# Patient Record
Sex: FEMALE | Race: WHITE | NOT HISPANIC OR LATINO | Employment: UNEMPLOYED | URBAN - METROPOLITAN AREA
[De-identification: names, ages, dates, MRNs, and addresses within clinical notes are randomized per-mention and may not be internally consistent; named-entity substitution may affect disease eponyms.]

---

## 2021-08-09 ENCOUNTER — HOSPITAL ENCOUNTER (EMERGENCY)
Facility: CLINIC | Age: 61
Discharge: HOME | End: 2021-08-10
Attending: EMERGENCY MEDICINE
Payer: COMMERCIAL

## 2021-08-09 DIAGNOSIS — R04.2 HEMOPTYSIS: ICD-10-CM

## 2021-08-09 LAB
ALBUMIN SERPL-MCNC: 3.7 G/DL (ref 3.4–5)
ALP SERPL-CCNC: 83 U/L (ref 40–150)
ALT SERPL W P-5'-P-CCNC: 30 U/L (ref 0–50)
ANION GAP SERPL CALCULATED.3IONS-SCNC: 5 MMOL/L (ref 3–14)
AST SERPL W P-5'-P-CCNC: 29 U/L (ref 0–45)
BILIRUB SERPL-MCNC: 0.3 MG/DL (ref 0.2–1.3)
BUN SERPL-MCNC: 15 MG/DL (ref 7–30)
CALCIUM SERPL-MCNC: 8.8 MG/DL (ref 8.5–10.1)
CHLORIDE BLD-SCNC: 106 MMOL/L (ref 94–109)
CO2 SERPL-SCNC: 27 MMOL/L (ref 20–32)
CREAT SERPL-MCNC: 0.6 MG/DL (ref 0.52–1.04)
GFR SERPL CREATININE-BSD FRML MDRD: >90 ML/MIN/1.73M2
GLUCOSE BLD-MCNC: 79 MG/DL (ref 70–99)
HOLD SPECIMEN: NORMAL
INR PPP: 0.94 (ref 0.85–1.15)
POTASSIUM BLD-SCNC: 3.8 MMOL/L (ref 3.4–5.3)
PROT SERPL-MCNC: 8 G/DL (ref 6.8–8.8)
SODIUM SERPL-SCNC: 138 MMOL/L (ref 133–144)

## 2021-08-09 PROCEDURE — 99284 EMERGENCY DEPT VISIT MOD MDM: CPT | Mod: 25 | Performed by: EMERGENCY MEDICINE

## 2021-08-09 PROCEDURE — 36415 COLL VENOUS BLD VENIPUNCTURE: CPT | Performed by: EMERGENCY MEDICINE

## 2021-08-09 PROCEDURE — 93005 ELECTROCARDIOGRAM TRACING: CPT | Performed by: EMERGENCY MEDICINE

## 2021-08-09 PROCEDURE — 84484 ASSAY OF TROPONIN QUANT: CPT | Performed by: EMERGENCY MEDICINE

## 2021-08-09 PROCEDURE — 99285 EMERGENCY DEPT VISIT HI MDM: CPT | Mod: 25 | Performed by: EMERGENCY MEDICINE

## 2021-08-09 PROCEDURE — 82040 ASSAY OF SERUM ALBUMIN: CPT | Performed by: EMERGENCY MEDICINE

## 2021-08-09 PROCEDURE — 85610 PROTHROMBIN TIME: CPT | Performed by: EMERGENCY MEDICINE

## 2021-08-09 PROCEDURE — 93010 ELECTROCARDIOGRAM REPORT: CPT | Performed by: EMERGENCY MEDICINE

## 2021-08-09 PROCEDURE — 84155 ASSAY OF PROTEIN SERUM: CPT | Performed by: EMERGENCY MEDICINE

## 2021-08-09 PROCEDURE — 85025 COMPLETE CBC W/AUTO DIFF WBC: CPT | Performed by: EMERGENCY MEDICINE

## 2021-08-09 ASSESSMENT — MIFFLIN-ST. JEOR: SCORE: 1058.03

## 2021-08-09 NOTE — ED TRIAGE NOTES
Pt presents accompanied by her daughter with c/o hemoptysis x 6 wks.  H/o bronchiectasis.  Plan is for a bronchoscopy and lung wash.  +Rt flank pain.  No fever. She recently relocated from [place] to see specialists here.

## 2021-08-09 NOTE — ED PROVIDER NOTES
[place] EMERGENCY DEPARTMENT ([facility])  August 9, 2021  History     Chief Complaint   Patient presents with     Hemoptysis     HPI  Alis Zambrano is a 61 year old female with prior history of bronchiectasis who presents for further evaluation of cough with hemoptysis. Patient reports that she was diagnosed with bronchiectasis 5 to 7 years ago at [facility].  She states that things have been relatively stable for quite some time.  She does report some degree of chronic shortness of breath, as well as some degree of intermittent chronic cough and intermittent mucus production.  She states that about 6 weeks ago she had an episode of a small amount of hemoptysis with a small amount of clot coming up.  That spontaneously resolved, though came back again sometime later.  She subsequently notes that this past Saturday, 2 days ago, she had another bout but this time with quite a bit more blood.  She states she coughed up half a cup or more of bright red blood.  She reports that her shortness of breath also has seemed to progressively worsen.  She states that since Saturday she has had a small amount of blood streaking in her sputum intermittently.  No chest pain but she has had some back pain near her left bra line intermittently, intermittantly states it is pretty bad.  No fevers, abdominal pain, nausea, vomiting, diarrhea.  She states she has passed some mucus in her stool as well over the last few weeks.  She also reports she has had some lack of appetite and overall is feeling fatigued and somewhat weak.  She states she does not have any other chronic medical issues, does not take any medications on a daily basis.  No history of bleeding disorder.  No recent bloody nose, though she does states she chronically is an easy bruising.  This part of the medical record was transcribed by Betito Menon [employer].      She was just seen at [facility] emergency department 2 days ago for  "hemoptysis.  She had been coughing at morning and noticed some blood in her cough, coughing up several clots.,  Prior to that had been seen at the [address] ED.  She has had CT scan that showed:  CT scan as an outpatient which demonstrated: \"Progressive nonspecific peripheral multifocal consolidation of the right middle lobe and right lower lobe with areas of bronchiectasis. Infectious versus inflammatory process is possible. No imaging findings to suggest malignancy. Recommend management by Pulmonology and followup imaging.\"     She did just moved to the [facility] to be closer to medical care and to her daughter.  The her daughter called the pulmonary clinic today and was told that it would be quite some time before she could get a pulmonary appointment and they recommended that they she should come in because they did not think she should wait that long to be seen.  This part of the medical record was transcribed by Betito Menon [employer].     PAST MEDICAL HISTORY: History reviewed. No pertinent past medical history.    PAST SURGICAL HISTORY: No past surgical history on file.    Past medical history, past surgical history, medications, and allergies were reviewed with the patient. Additional pertinent items: None    FAMILY HISTORY: No family history on file.    SOCIAL HISTORY:   Social History     Tobacco Use     Smoking status: Not on file   Substance Use Topics     Alcohol use: Not on file     Social history was reviewed with the patient. Additional pertinent items: None      There are no discharge medications for this patient.         Allergies   Allergen Reactions     Sulfa Drugs Other (See Comments) and Hives     All vitamins makes her esophagus spasm, heartburn     Augmentin Diarrhea     Cetirizine      Diphenhydramine      Famotidine      Ipratropium      Pantoprazole      Shrimp Flavor      Camphor Other (See Comments)     Hyper     Ciprofloxacin Other (See Comments)     Other " "reaction(s): Unknown/Not Verified  Knees didn't work for a couple of months  Knees don't work  Leg weakness       Codeine Dizziness, Headache and Nausea and Vomiting     Conjugated Estrogens Other (See Comments)     Other reaction(s): Unknown/Not Verified  Knees didn't work  Knees don't work       Nitrofurantoin Other (See Comments)     Achey       Penicillins Diarrhea     Prednisone Anxiety and Other (See Comments)     Other reaction(s): Tremors, Unknown/Not Verified  Anxiety          Review of Systems   Constitutional: Positive for activity change, appetite change and fatigue. Negative for chills and fever.   Respiratory: Positive for cough and shortness of breath.    Cardiovascular: Negative for chest pain.   Gastrointestinal: Negative for abdominal pain, diarrhea, nausea and vomiting.   Musculoskeletal: Positive for back pain (L braline).   Hematological: Bruises/bleeds easily.   All other systems reviewed and are negative.    A complete review of systems was performed with pertinent positives and negatives noted in the HPI, and all other systems negative.    Physical Exam   BP: 120/66  Pulse: 90  Temp: 98.7  F (37.1  C)  Resp: 16  Height: 167.6 cm (5' 6\")  Weight: 47.6 kg (105 lb)  SpO2: 96 %      Physical Exam  Constitutional:       General: She is not in acute distress.     Appearance: She is not diaphoretic.   HENT:      Head: Atraumatic.   Eyes:      General: No scleral icterus.  Cardiovascular:      Heart sounds: Normal heart sounds.   Pulmonary:      Effort: No respiratory distress.      Breath sounds: Normal breath sounds.   Abdominal:      Palpations: Abdomen is soft.      Tenderness: There is no abdominal tenderness.   Musculoskeletal:         General: No tenderness.   Skin:     General: Skin is warm.      Findings: No rash.         ED Course   12:32 AM  The patient was seen and examined by Rani Peoples MD in Room ED12.       Procedures            EKG Interpretation:      Interpreted by Rani Ochoa " MD Richelle  Time reviewed: 0055  Symptoms at time of EKG: none   Rhythm: normal sinus   Rate: normal  Axis: normal  Ectopy: none  Conduction: normal  ST Segments/ T Waves: No ST-T wave changes  Q Waves: none  Comparison to prior: No old EKG available    Clinical Impression: normal EKG               Results for orders placed or performed during the hospital encounter of 08/09/21 (from the past 24 hour(s))   Mart Draw *Canceled*    Narrative    The following orders were created for panel order Mart Draw.  Procedure                               Abnormality         Status                     ---------                               -----------         ------                       Please view results for these tests on the individual orders.   Mart Draw    Narrative    The following orders were created for panel order Mart Draw.  Procedure                               Abnormality         Status                     ---------                               -----------         ------                     Extra Red Top Tube[[id number]]                               Final result               Extra Green Top (Lithium...[[id number]]                      Final result               Extra Purple Top Tube[[id number]]                            Final result               Extra Blood Bank Purple ...[[id number]]                      In process                   Please view results for these tests on the individual orders.   Extra Red Top Tube   Result Value Ref Range    Hold Specimen JIC    Extra Green Top (Lithium Heparin) Tube   Result Value Ref Range    Hold Specimen JIC    Extra Purple Top Tube   Result Value Ref Range    Hold Specimen JIC    Extra Tube    Narrative    The following orders were created for panel order Extra Tube.  Procedure                               Abnormality         Status                     ---------                               -----------         ------                     Extra Blue Top  Tube[[id number]]                              Final result                 Please view results for these tests on the individual orders.   Extra Blue Top Tube   Result Value Ref Range    Hold Specimen JIC    CBC with platelets differential    Narrative    The following orders were created for panel order CBC with platelets differential.  Procedure                               Abnormality         Status                     ---------                               -----------         ------                     CBC with platelets and d...[[id number]]                      Final result                 Please view results for these tests on the individual orders.   INR   Result Value Ref Range    INR 0.94 0.85 - 1.15   ABO/Rh type and screen *Canceled*    Narrative    The following orders were created for panel order ABO/Rh type and screen.  Procedure                               Abnormality         Status                     ---------                               -----------         ------                     Adult Type and Screen[[id number]]                                                         Please view results for these tests on the individual orders.   Comprehensive metabolic panel   Result Value Ref Range    Sodium 138 133 - 144 mmol/L    Potassium 3.8 3.4 - 5.3 mmol/L    Chloride 106 94 - 109 mmol/L    Carbon Dioxide (CO2) 27 20 - 32 mmol/L    Anion Gap 5 3 - 14 mmol/L    Urea Nitrogen 15 7 - 30 mg/dL    Creatinine 0.60 0.52 - 1.04 mg/dL    Calcium 8.8 8.5 - 10.1 mg/dL    Glucose 79 70 - 99 mg/dL    Alkaline Phosphatase 83 40 - 150 U/L    AST 29 0 - 45 U/L    ALT 30 0 - 50 U/L    Protein Total 8.0 6.8 - 8.8 g/dL    Albumin 3.7 3.4 - 5.0 g/dL    Bilirubin Total 0.3 0.2 - 1.3 mg/dL    GFR Estimate >90 >60 mL/min/1.73m2   CBC with platelets and differential   Result Value Ref Range    WBC Count 6.7 4.0 - 11.0 10e3/uL    RBC Count 4.62 3.80 - 5.20 10e6/uL    Hemoglobin 12.9 11.7 - 15.7 g/dL    Hematocrit 41.0 35.0 -  47.0 %    MCV 89 78 - 100 fL    MCH 27.9 26.5 - 33.0 pg    MCHC 31.5 31.5 - 36.5 g/dL    RDW 14.9 10.0 - 15.0 %    Platelet Count 378 150 - 450 10e3/uL    % Neutrophils 64 %    % Lymphocytes 21 %    % Monocytes 10 %    % Eosinophils 4 %    % Basophils 1 %    % Immature Granulocytes 0 %    NRBCs per 100 WBC 0 <1 /100    Absolute Neutrophils 4.3 1.6 - 8.3 10e3/uL    Absolute Lymphocytes 1.4 0.8 - 5.3 10e3/uL    Absolute Monocytes 0.7 0.0 - 1.3 10e3/uL    Absolute Eosinophils 0.3 0.0 - 0.7 10e3/uL    Absolute Basophils 0.1 0.0 - 0.2 10e3/uL    Absolute Immature Granulocytes 0.0 <=0.0 10e3/uL    Absolute NRBCs 0.0 10e3/uL   Troponin I   Result Value Ref Range    Troponin I <0.015 0.000 - 0.045 ug/L   ABO/Rh type and screen *Canceled*    Narrative    The following orders were created for panel order ABO/Rh type and screen.  Procedure                               Abnormality         Status                     ---------                               -----------         ------                       Please view results for these tests on the individual orders.   Chest XR,  PA & LAT    Narrative    EXAM: XR CHEST 2 VW  LOCATION: [facility]  DATE/TIME: 8/10/2021 12:35 AM    INDICATION: Hemoptysis, shortness of breath.    COMPARISON: None.    FINDINGS: Large lung volumes. Linear opacity in the right middle lobe, best seen on the lateral view, may be due to atelectasis but infiltrate is not excluded. Clinical correlation. Left lung is clear. Normal heart size and pulmonary vascularity. No   significant bony abnormalities.      Impression    IMPRESSION:  1. Hyperinflation of the lungs suggesting underlying obstructive lung disease.  2. Indeterminate linear opacity in the right middle lobe may be due to infiltrate versus atelectasis. Recommend short-term follow-up chest x-ray to monitor for clearing.   EKG 12 lead   Result Value Ref Range    Systolic Blood Pressure  mmHg    Diastolic  Blood Pressure  mmHg    Ventricular Rate 67 BPM    Atrial Rate 67 BPM    MA Interval 150 ms    QRS Duration 78 ms     ms    QTc 454 ms    P Axis 82 degrees    R AXIS 86 degrees    T Axis 74 degrees    Interpretation ECG Sinus rhythm  Normal ECG        Medications   0.9% sodium chloride BOLUS (0 mLs Intravenous Stopped 8/10/21 0315)             Assessments & Plan (with Medical Decision Making)   CBC was checked, hemoglobin is 12.9, platelets normal.  INR is normal as well.  EKG is unremarkable, troponin was negative.  Chest x-ray revealed hyperinflation of the lungs suggesting underlying obstructive lung disease as well as indeterminate linear opacity of the right middle lobe may be due to infiltrate versus atelectasis.  Patient is doing relatively well at this point in time.  No active hemoptysis.  I did speak with the pulmonologist on-call .  It is clear that the patient would benefit from a bronchoscopy.  After discussion, we agreed that this likely can safely be done as an outpatient, particularly in light of our critical bed shortage here in the hospital.  Dr. Lopes will attempt to arrange an urgent outpatient bronchoscopy in the morning.  Patient is told she may have something to eat ASAP, then remain n.p.o. until she hears back from the pulmonary clinic in the morning.  She is encouraged to return immediately if she develops any significant hemoptysis, any other concerns.  She verbalizes understanding and is agreeable to the plan.  She is also instructed to call the clinic if she does not hear back from them by noon.    Dictation Disclaimer: Some of this Note has been completed with voice-recognition dictation software. Although errors are generally corrected real-time, there is the potential for a rare error to be present in the completed chart.      I have reviewed the nursing notes.    I have reviewed the findings, diagnosis, plan and need for follow up with the patient.    There  are no discharge medications for this patient.      Final diagnoses:   Hemoptysis     ILionel, am serving as a trained medical scribe to document services personally performed by Rani Pepoles MD, based on the provider's statements to me.   Rani JAMIL MD, was physically present and have reviewed and verified the accuracy of this note documented by Lionel Bolaños.      8/9/2021   [facility] EMERGENCY DEPARTMENT     Rani Peoples MD  08/10/21 0621

## 2021-08-10 ENCOUNTER — APPOINTMENT (OUTPATIENT)
Dept: GENERAL RADIOLOGY | Facility: CLINIC | Age: 61
End: 2021-08-10
Attending: EMERGENCY MEDICINE
Payer: COMMERCIAL

## 2021-08-10 ENCOUNTER — HOSPITAL ENCOUNTER (OUTPATIENT)
Facility: CLINIC | Age: 61
Discharge: HOME | End: 2021-08-10
Attending: INTERNAL MEDICINE | Admitting: INTERNAL MEDICINE
Payer: COMMERCIAL

## 2021-08-10 ENCOUNTER — ANCILLARY PROCEDURE (OUTPATIENT)
Dept: CT IMAGING | Facility: CLINIC | Age: 61
End: 2021-08-10
Attending: INTERNAL MEDICINE
Payer: COMMERCIAL

## 2021-08-10 VITALS
TEMPERATURE: 98.2 F | WEIGHT: 107.14 LBS | RESPIRATION RATE: 20 BRPM | HEART RATE: 98 BPM | DIASTOLIC BLOOD PRESSURE: 64 MMHG | SYSTOLIC BLOOD PRESSURE: 125 MMHG | OXYGEN SATURATION: 98 % | HEIGHT: 66 IN | BODY MASS INDEX: 17.22 KG/M2

## 2021-08-10 VITALS
SYSTOLIC BLOOD PRESSURE: 99 MMHG | OXYGEN SATURATION: 97 % | WEIGHT: 105 LBS | TEMPERATURE: 98.7 F | DIASTOLIC BLOOD PRESSURE: 62 MMHG | RESPIRATION RATE: 16 BRPM | BODY MASS INDEX: 16.88 KG/M2 | HEART RATE: 75 BPM | HEIGHT: 66 IN

## 2021-08-10 DIAGNOSIS — R04.2 HEMOPTYSIS: ICD-10-CM

## 2021-08-10 DIAGNOSIS — R04.2 HEMOPTYSIS: Primary | ICD-10-CM

## 2021-08-10 LAB
APPEARANCE FLD: ABNORMAL
ATRIAL RATE - MUSE: 67 BPM
BASOPHILS # BLD AUTO: 0.1 10E3/UL (ref 0–0.2)
BASOPHILS NFR BLD AUTO: 1 %
BRONCHOSCOPY: NORMAL
COLOR FLD: ABNORMAL
DIASTOLIC BLOOD PRESSURE - MUSE: NORMAL MMHG
EOSINOPHIL # BLD AUTO: 0.3 10E3/UL (ref 0–0.7)
EOSINOPHIL NFR BLD AUTO: 4 %
ERYTHROCYTE [DISTWIDTH] IN BLOOD BY AUTOMATED COUNT: 14.9 % (ref 10–15)
GLUCOSE BLDC GLUCOMTR-MCNC: 73 MG/DL (ref 70–99)
GRAM STAIN RESULT: NORMAL
GRAM STAIN RESULT: NORMAL
HCT VFR BLD AUTO: 41 % (ref 35–47)
HGB BLD-MCNC: 12.9 G/DL (ref 11.7–15.7)
IMM GRANULOCYTES # BLD: 0 10E3/UL
IMM GRANULOCYTES NFR BLD: 0 %
INTERPRETATION ECG - MUSE: NORMAL
LYMPHOCYTES # BLD AUTO: 1.4 10E3/UL (ref 0.8–5.3)
LYMPHOCYTES NFR BLD AUTO: 21 %
LYMPHOCYTES NFR FLD MANUAL: 5 %
MCH RBC QN AUTO: 27.9 PG (ref 26.5–33)
MCHC RBC AUTO-ENTMCNC: 31.5 G/DL (ref 31.5–36.5)
MCV RBC AUTO: 89 FL (ref 78–100)
MONOCYTES # BLD AUTO: 0.7 10E3/UL (ref 0–1.3)
MONOCYTES NFR BLD AUTO: 10 %
MONOS+MACROS NFR FLD MANUAL: 11 %
NEUTROPHILS # BLD AUTO: 4.3 10E3/UL (ref 1.6–8.3)
NEUTROPHILS NFR BLD AUTO: 64 %
NEUTS BAND NFR FLD MANUAL: 84 %
NRBC # BLD AUTO: 0 10E3/UL
NRBC BLD AUTO-RTO: 0 /100
P AXIS - MUSE: 82 DEGREES
PLATELET # BLD AUTO: 378 10E3/UL (ref 150–450)
PR INTERVAL - MUSE: 150 MS
QRS DURATION - MUSE: 78 MS
QT - MUSE: 430 MS
QTC - MUSE: 454 MS
R AXIS - MUSE: 86 DEGREES
RBC # BLD AUTO: 4.62 10E6/UL (ref 3.8–5.2)
SARS-COV-2 RNA RESP QL NAA+PROBE: NEGATIVE
SYSTOLIC BLOOD PRESSURE - MUSE: NORMAL MMHG
T AXIS - MUSE: 74 DEGREES
TROPONIN I SERPL-MCNC: <0.015 UG/L (ref 0–0.04)
VENTRICULAR RATE- MUSE: 67 BPM
WBC # BLD AUTO: 6.7 10E3/UL (ref 4–11)
WBC # FLD AUTO: 2675 /UL

## 2021-08-10 PROCEDURE — U0005 INFEC AGEN DETEC AMPLI PROBE: HCPCS | Performed by: INTERNAL MEDICINE

## 2021-08-10 PROCEDURE — 31624 DX BRONCHOSCOPE/LAVAGE: CPT | Performed by: INTERNAL MEDICINE

## 2021-08-10 PROCEDURE — 31624 DX BRONCHOSCOPE/LAVAGE: CPT | Mod: GC | Performed by: INTERNAL MEDICINE

## 2021-08-10 PROCEDURE — 87102 FUNGUS ISOLATION CULTURE: CPT | Mod: XU | Performed by: INTERNAL MEDICINE

## 2021-08-10 PROCEDURE — 96360 HYDRATION IV INFUSION INIT: CPT | Performed by: EMERGENCY MEDICINE

## 2021-08-10 PROCEDURE — 71250 CT THORAX DX C-: CPT | Mod: 26 | Performed by: RADIOLOGY

## 2021-08-10 PROCEDURE — 99152 MOD SED SAME PHYS/QHP 5/>YRS: CPT | Mod: GC | Performed by: INTERNAL MEDICINE

## 2021-08-10 PROCEDURE — 87070 CULTURE OTHR SPECIMN AEROBIC: CPT | Performed by: INTERNAL MEDICINE

## 2021-08-10 PROCEDURE — 87206 SMEAR FLUORESCENT/ACID STAI: CPT | Performed by: INTERNAL MEDICINE

## 2021-08-10 PROCEDURE — 89051 BODY FLUID CELL COUNT: CPT | Performed by: INTERNAL MEDICINE

## 2021-08-10 PROCEDURE — 250N000011 HC RX IP 250 OP 636: Performed by: INTERNAL MEDICINE

## 2021-08-10 PROCEDURE — 87102 FUNGUS ISOLATION CULTURE: CPT | Performed by: INTERNAL MEDICINE

## 2021-08-10 PROCEDURE — 87116 MYCOBACTERIA CULTURE: CPT | Performed by: INTERNAL MEDICINE

## 2021-08-10 PROCEDURE — G0500 MOD SEDAT ENDO SERVICE >5YRS: HCPCS | Performed by: INTERNAL MEDICINE

## 2021-08-10 PROCEDURE — 88108 CYTOPATH CONCENTRATE TECH: CPT | Mod: TC | Performed by: INTERNAL MEDICINE

## 2021-08-10 PROCEDURE — 87633 RESP VIRUS 12-25 TARGETS: CPT | Mod: XU | Performed by: INTERNAL MEDICINE

## 2021-08-10 PROCEDURE — 250N000009 HC RX 250: Performed by: INTERNAL MEDICINE

## 2021-08-10 PROCEDURE — 71046 X-RAY EXAM CHEST 2 VIEWS: CPT | Mod: 26 | Performed by: RADIOLOGY

## 2021-08-10 PROCEDURE — 96361 HYDRATE IV INFUSION ADD-ON: CPT | Mod: 59 | Performed by: EMERGENCY MEDICINE

## 2021-08-10 PROCEDURE — 258N000003 HC RX IP 258 OP 636: Performed by: EMERGENCY MEDICINE

## 2021-08-10 PROCEDURE — 71046 X-RAY EXAM CHEST 2 VIEWS: CPT

## 2021-08-10 PROCEDURE — 87205 SMEAR GRAM STAIN: CPT | Performed by: INTERNAL MEDICINE

## 2021-08-10 RX ORDER — LIDOCAINE 40 MG/G
CREAM TOPICAL
Status: DISCONTINUED | OUTPATIENT
Start: 2021-08-10 | End: 2021-08-10 | Stop reason: HOSPADM

## 2021-08-10 RX ORDER — ONDANSETRON 2 MG/ML
4 INJECTION INTRAMUSCULAR; INTRAVENOUS EVERY 6 HOURS PRN
Status: CANCELLED | OUTPATIENT
Start: 2021-08-10

## 2021-08-10 RX ORDER — NALOXONE HYDROCHLORIDE 0.4 MG/ML
0.2 INJECTION, SOLUTION INTRAMUSCULAR; INTRAVENOUS; SUBCUTANEOUS
Status: CANCELLED | OUTPATIENT
Start: 2021-08-10

## 2021-08-10 RX ORDER — FENTANYL CITRATE 50 UG/ML
INJECTION, SOLUTION INTRAMUSCULAR; INTRAVENOUS PRN
Status: COMPLETED | OUTPATIENT
Start: 2021-08-10 | End: 2021-08-10

## 2021-08-10 RX ORDER — MAGNESIUM HYDROXIDE 1200 MG/15ML
LIQUID ORAL PRN
Status: COMPLETED | OUTPATIENT
Start: 2021-08-10 | End: 2021-08-10

## 2021-08-10 RX ORDER — LIDOCAINE HYDROCHLORIDE 20 MG/ML
INJECTION, SOLUTION INFILTRATION; PERINEURAL PRN
Status: COMPLETED | OUTPATIENT
Start: 2021-08-10 | End: 2021-08-10

## 2021-08-10 RX ORDER — FLUMAZENIL 0.1 MG/ML
0.2 INJECTION, SOLUTION INTRAVENOUS
Status: CANCELLED | OUTPATIENT
Start: 2021-08-10 | End: 2021-08-11

## 2021-08-10 RX ORDER — ONDANSETRON 4 MG/1
4 TABLET, ORALLY DISINTEGRATING ORAL EVERY 6 HOURS PRN
Status: CANCELLED | OUTPATIENT
Start: 2021-08-10

## 2021-08-10 RX ORDER — NALOXONE HYDROCHLORIDE 0.4 MG/ML
0.4 INJECTION, SOLUTION INTRAMUSCULAR; INTRAVENOUS; SUBCUTANEOUS
Status: CANCELLED | OUTPATIENT
Start: 2021-08-10

## 2021-08-10 RX ORDER — LIDOCAINE HYDROCHLORIDE 40 MG/ML
INJECTION, SOLUTION RETROBULBAR PRN
Status: COMPLETED | OUTPATIENT
Start: 2021-08-10 | End: 2021-08-10

## 2021-08-10 RX ORDER — LIDOCAINE HYDROCHLORIDE 10 MG/ML
INJECTION, SOLUTION INFILTRATION; PERINEURAL PRN
Status: COMPLETED | OUTPATIENT
Start: 2021-08-10 | End: 2021-08-10

## 2021-08-10 RX ADMIN — MIDAZOLAM 0.5 MG: 1 INJECTION INTRAMUSCULAR; INTRAVENOUS at 15:04

## 2021-08-10 RX ADMIN — SODIUM CHLORIDE 120 ML: 900 IRRIGANT IRRIGATION at 15:24

## 2021-08-10 RX ADMIN — LIDOCAINE HYDROCHLORIDE 9 ML: 10 INJECTION, SOLUTION INFILTRATION; PERINEURAL at 15:22

## 2021-08-10 RX ADMIN — FENTANYL CITRATE 25 MCG: 50 INJECTION, SOLUTION INTRAMUSCULAR; INTRAVENOUS at 15:00

## 2021-08-10 RX ADMIN — LIDOCAINE HYDROCHLORIDE 9 ML: 40 INJECTION, SOLUTION RETROBULBAR; TOPICAL at 15:23

## 2021-08-10 RX ADMIN — SODIUM CHLORIDE 1000 ML: 9 INJECTION, SOLUTION INTRAVENOUS at 01:36

## 2021-08-10 RX ADMIN — MIDAZOLAM 1 MG: 1 INJECTION INTRAMUSCULAR; INTRAVENOUS at 14:52

## 2021-08-10 RX ADMIN — MIDAZOLAM 0.5 MG: 1 INJECTION INTRAMUSCULAR; INTRAVENOUS at 15:00

## 2021-08-10 RX ADMIN — FENTANYL CITRATE 50 MCG: 50 INJECTION, SOLUTION INTRAMUSCULAR; INTRAVENOUS at 14:52

## 2021-08-10 RX ADMIN — LIDOCAINE HYDROCHLORIDE 5 ML: 20 INJECTION, SOLUTION INFILTRATION; PERINEURAL at 15:26

## 2021-08-10 RX ADMIN — MIDAZOLAM 1 MG: 1 INJECTION INTRAMUSCULAR; INTRAVENOUS at 14:56

## 2021-08-10 ASSESSMENT — ENCOUNTER SYMPTOMS
BRUISES/BLEEDS EASILY: 1
CHILLS: 0
DIARRHEA: 0
SHORTNESS OF BREATH: 1
VOMITING: 0
FATIGUE: 1
ACTIVITY CHANGE: 1
FEVER: 0
ABDOMINAL PAIN: 0
COUGH: 1
NAUSEA: 0
APPETITE CHANGE: 1
BACK PAIN: 1

## 2021-08-10 ASSESSMENT — MIFFLIN-ST. JEOR: SCORE: 1067.75

## 2021-08-10 NOTE — DISCHARGE INSTRUCTIONS
Discharge Instructions after Bronchoscopy    Activity  You had medicine to relax and for pain. You may feel dizzy or sleepy.  For 24 hours:    Do not drive or use heavy equipment.    Do not make important decisions.    Do not drink any alcohol.    Diet  When you can swallow easily, you may go back to your regular diet, medicines  and light exercise.    Follow-up  We took small tissue or fluid samples to study. We will call you with the results in about 10 business days.    Call right away if you have:    Unusual chest pain    Temperature above 100.6  F (37.5  C)    Coughing that does not stop.    If you have severe pain, bleeding, or shortness of breath, go to an emergency room.    If you have questions, call:  Monday to Friday, 8 a.m. to 4:30 p.m.  Endoscopy: [phone] (We may have to call you back)    After hours:  Hospital: [phone] (Ask for the pulmonary fellow on call)

## 2021-08-10 NOTE — DISCHARGE INSTRUCTIONS
Eat something asap, then don't eat again until you hear from the pulmonologist. Return to the ER if your bleeding increases or if you have any other worsening or concerns.      Call the Pulmonology Clinic (phone: [phone]) if you have not gotten a phone call with an appointment by noon today. You can let them know that I spoke with Dr Lopes who was going to work on arranging and urgent bronchoscopy.

## 2021-08-11 LAB
CMV DNA SPEC NAA+PROBE-ACNC: NOT DETECTED IU/ML
FLUAV H1 2009 PAND RNA SPEC QL NAA+PROBE: NEGATIVE
FLUAV H1 RNA SPEC QL NAA+PROBE: NEGATIVE
FLUAV H3 RNA SPEC QL NAA+PROBE: NEGATIVE
FLUAV RNA SPEC QL NAA+PROBE: NEGATIVE
FLUBV RNA SPEC QL NAA+PROBE: NEGATIVE
HADV DNA SPEC QL NAA+PROBE: NEGATIVE
HADV DNA SPEC QL NAA+PROBE: NEGATIVE
HMPV RNA SPEC QL NAA+PROBE: NEGATIVE
HPIV1 RNA SPEC QL NAA+PROBE: NEGATIVE
HPIV2 RNA SPEC QL NAA+PROBE: NEGATIVE
HPIV3 RNA SPEC QL NAA+PROBE: NEGATIVE
PATH REPORT.COMMENTS IMP SPEC: NORMAL
PATH REPORT.FINAL DX SPEC: NORMAL
PATH REPORT.GROSS SPEC: NORMAL
PATH REPORT.MICROSCOPIC SPEC OTHER STN: NORMAL
PATH REPORT.RELEVANT HX SPEC: NORMAL
RHINOVIRUS RNA SPEC QL NAA+PROBE: NEGATIVE
RSV RNA SPEC QL NAA+PROBE: NEGATIVE
RSV RNA SPEC QL NAA+PROBE: NEGATIVE

## 2021-08-11 PROCEDURE — 88312 SPECIAL STAINS GROUP 1: CPT | Mod: 26 | Performed by: PATHOLOGY

## 2021-08-11 PROCEDURE — 88108 CYTOPATH CONCENTRATE TECH: CPT | Mod: 26 | Performed by: PATHOLOGY

## 2021-08-12 LAB — BACTERIA BRONCH: NORMAL

## 2021-08-15 LAB — HOLD SPECIMEN: NORMAL

## 2021-08-22 ENCOUNTER — HEALTH MAINTENANCE LETTER (OUTPATIENT)
Age: 61
End: 2021-08-22

## 2021-09-07 LAB
BACTERIA BRONCH: NO GROWTH
BACTERIA BRONCH: NO GROWTH

## 2021-09-08 ENCOUNTER — OFFICE VISIT (OUTPATIENT)
Dept: FAMILY MEDICINE | Facility: CLINIC | Age: 61
End: 2021-09-08
Payer: COMMERCIAL

## 2021-09-08 VITALS
HEART RATE: 92 BPM | RESPIRATION RATE: 14 BRPM | DIASTOLIC BLOOD PRESSURE: 60 MMHG | TEMPERATURE: 96.6 F | OXYGEN SATURATION: 97 % | SYSTOLIC BLOOD PRESSURE: 108 MMHG | BODY MASS INDEX: 18.49 KG/M2 | WEIGHT: 111 LBS | HEIGHT: 65 IN

## 2021-09-08 DIAGNOSIS — J47.9 BRONCHIECTASIS WITHOUT COMPLICATION (H): Primary | ICD-10-CM

## 2021-09-08 DIAGNOSIS — Z11.3 SCREEN FOR STD (SEXUALLY TRANSMITTED DISEASE): ICD-10-CM

## 2021-09-08 DIAGNOSIS — Z11.4 SCREENING FOR HIV (HUMAN IMMUNODEFICIENCY VIRUS): ICD-10-CM

## 2021-09-08 DIAGNOSIS — Z12.31 VISIT FOR SCREENING MAMMOGRAM: ICD-10-CM

## 2021-09-08 DIAGNOSIS — L82.1 SEBORRHEIC KERATOSES: ICD-10-CM

## 2021-09-08 DIAGNOSIS — Z88.9 MULTIPLE DRUG ALLERGIES: ICD-10-CM

## 2021-09-08 DIAGNOSIS — Z11.59 NEED FOR HEPATITIS C SCREENING TEST: ICD-10-CM

## 2021-09-08 DIAGNOSIS — Z12.11 SCREEN FOR COLON CANCER: ICD-10-CM

## 2021-09-08 DIAGNOSIS — Z13.220 SCREENING FOR HYPERLIPIDEMIA: ICD-10-CM

## 2021-09-08 LAB
CHOLEST SERPL-MCNC: 254 MG/DL
HCV AB SERPL QL IA: NONREACTIVE
HCV AB SERPL QL IA: NONREACTIVE
HDLC SERPL-MCNC: 76 MG/DL
HIV 1+2 AB+HIV1 P24 AG SERPL QL IA: NONREACTIVE
LDLC SERPL CALC-MCNC: 159 MG/DL
NONHDLC SERPL-MCNC: 178 MG/DL
T PALLIDUM AB SER QL: NONREACTIVE
TRIGL SERPL-MCNC: 94 MG/DL

## 2021-09-08 PROCEDURE — 87591 N.GONORRHOEAE DNA AMP PROB: CPT | Performed by: INTERNAL MEDICINE

## 2021-09-08 PROCEDURE — 86803 HEPATITIS C AB TEST: CPT | Mod: 59 | Performed by: INTERNAL MEDICINE

## 2021-09-08 PROCEDURE — 87389 HIV-1 AG W/HIV-1&-2 AB AG IA: CPT | Performed by: INTERNAL MEDICINE

## 2021-09-08 PROCEDURE — 36415 COLL VENOUS BLD VENIPUNCTURE: CPT | Performed by: INTERNAL MEDICINE

## 2021-09-08 PROCEDURE — 86780 TREPONEMA PALLIDUM: CPT | Performed by: INTERNAL MEDICINE

## 2021-09-08 PROCEDURE — 86803 HEPATITIS C AB TEST: CPT | Performed by: INTERNAL MEDICINE

## 2021-09-08 PROCEDURE — 99203 OFFICE O/P NEW LOW 30 MIN: CPT | Performed by: INTERNAL MEDICINE

## 2021-09-08 PROCEDURE — 80061 LIPID PANEL: CPT | Performed by: INTERNAL MEDICINE

## 2021-09-08 PROCEDURE — 87491 CHLMYD TRACH DNA AMP PROBE: CPT | Performed by: INTERNAL MEDICINE

## 2021-09-08 ASSESSMENT — MIFFLIN-ST. JEOR: SCORE: 1074.98

## 2021-09-08 NOTE — PROGRESS NOTES
"    Assessment & Plan     Bronchiectasis without complication (H) -has plans to establish pulmonary on 9/10    Multiple drug allergies -reviewed her allergy list in detail.  Most of her \"allergies\" are really just side effects and she recognizes this.  She finds she is sensitive to medication and strongly prefers to avoid medication if possible    Screening for HIV (human immunodeficiency virus)   - HIV Antigen Antibody Combo; Future    Need for hepatitis C screening test  - Hepatitis C Screen Reflex to HCV RNA Quant and Genotype; Future  - Hepatitis C antibody; Future    Screening for hyperlipidemia -she reports high sugar high fat diet in order to maintain her weight.  She strongly prefers to avoid statins if at all possible  - Lipid panel reflex to direct LDL Fasting; Future    Screen for STD (sexually transmitted disease)  - NEISSERIA GONORRHOEA PCR  - CHLAMYDIA TRACHOMATIS PCR  - Treponema Abs w Reflex to RPR and Titer; Future    Visit for screening mammogram - due  - *MA Screening Digital Bilateral; Future    Seborrheic keratoses - no treatment needed    Screen for colon cancer - due 2024, will get JEFF               Patient Instructions   1. Ok to have tetanus and flu shot at your convenience  2. You are due for a mammogram.  Please call [phone] to schedule.  3. Blood and urine test today  4. Release signed for colonoscopy                No follow-ups on file.    Annetta Dior, [facility]    Betty Velez is a 61 year old who presents for the following health issues     HPI     ADV: handle  Mammogram: need order  Colonoscopy: 2015 - [facility] in [place]  PAP/HPV:  Flu shot: Declined   Shingles: check insurance  Tdap: Declined  Covid-19 vaccine done J       Establish Care:Today    Tdap: Family hx of reaction - wonder if should get one  --mother and brother had unknown 'bad reaction' to tetanus shot;  She had one years ago and tolerated well    Derm: Moles in " her back    LDL levels: eating a lot fats because low weight   --eats a high fat diet;  Strongly prefers to take any medications for high cholesterol.    STD: Was  for 40 years -  cheat on her - wonder if there is possible to have something and doesn't know.  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       Pt has been 3 times on the ED in the past month and half and  1 time in the hospital see details below     Bronchiectasis:  diagnosed with bronchiectasis 5 to 7 years ago at [facility].  She states that things have been relatively stable for quite some time.  She does report some degree of chronic shortness of breath, as well as some degree of intermittent chronic cough and intermittent mucus production  --bronch done; normal cultures  --has appointment with Pulm in 2 day     Findings:        The oropharynx appears normal. The larynx appears normal. The vocal        cords appear normal. The subglottic space is normal. The trachea is of        normal caliber. The oanh is sharp. The tracheobronchial tree was        examined to at least the first subsegmental level. Bronchial mucosa and        anatomy are normal; there are no endobronchial lesions, and no        secretions.        Bronchoalveolar lavage was performed in the RML medial segment (B5) of        the lung and sent for cell count, bacterial culture, viral smears &        culture, and fungal & AFB analysis and cytology. 120 mL of fluid were        instilled. 35 mL were returned. The return was blood-tinged. There were        no mucoid plugs in the return fluid. Fluid was blood tinged and        increased blood color on 2nd syringe        Moderately brochiectatic airways seen in all subsegments.   Impression:           -                         - Hemoptysis with abnormal CXR                         - The airway examination was normal.                         -  "Bronchoalveolar lavage was performed.   Moderate Sedation:        19 minutes   Recommendation:       - Await test results.     ED/UC Followup:    Facility:  [address] [place] [address] ED ([facility])  Date of visit: 7/3/21  Reason for visit: Cough with hemaptysis  Current Status: Feeling good - but always cough some what - its been almost a month there is no blood during cough.       ED/UC Followup:    Facility:  [facility] ED ([facility])  Date of visit: 8/7/21  Reason for visit: Hemoptysis (Primary Dx)  Current Status: see above     ED/UC Followup:    Facility:  [facility] ED  Date of visit: 8/9/21   Reason for visit: Hemoptysis    Current Status: see above     TONSILLECTOMY          BLADDER SUSPENSION          UPPER GASTROINTESTINAL ENDOSCOPY 5/21/2014   no follow up needed     COLONOSCOPY 1/1/2014 - 12/31/2014   repeat 2024     HYSTERECTOMY, VAGINAL 01/01/2006 N/A Phone call made to Connie and she reports Dr. Rubi, from [facility], removed everything. Also had a bladder lift     JF AND BSO                       Review of Systems   Constitutional, HEENT, cardiovascular, pulmonary, GI, , musculoskeletal, neuro, skin, endocrine and psych systems are negative, except as otherwise noted.      Objective    /60   Pulse 92   Temp (!) 96.6  F (35.9  C) (Tympanic)   Resp 14   Ht 1.66 m (5' 5.35\")   Wt 50.3 kg (111 lb)   SpO2 97%   Breastfeeding No   BMI 18.27 kg/m    Body mass index is 18.27 kg/m .  Physical Exam   GENERAL APPEARANCE: alert, no distress and thin  RESP: lungs clear to auscultation - no rales, rhonchi or wheezes  CV: regular rates and rhythm, normal S1 S2, no S3 or S4 and no murmur, click or rub  Skin: Stuck on brown papule on back              "

## 2021-09-08 NOTE — RESULT ENCOUNTER NOTE
Syphilis test is negative.  Cholesterol is quite elevated.  Given preference to avoid medications, okay to monitor for now.

## 2021-09-08 NOTE — PATIENT INSTRUCTIONS
1. Ok to have tetanus and flu shot at your convenience  2. You are due for a mammogram.  Please call [phone] to schedule.  3. Blood and urine test today  4. Release signed for colonoscopy

## 2021-09-08 NOTE — NURSING NOTE
Mickey signed today from: [facility]   Mari Rose CMA (MARTY)   (aka: Bibiana Rose)

## 2021-09-09 LAB
C TRACH DNA SPEC QL NAA+PROBE: NEGATIVE
N GONORRHOEA DNA SPEC QL NAA+PROBE: NEGATIVE

## 2021-09-10 ENCOUNTER — OFFICE VISIT (OUTPATIENT)
Dept: PULMONOLOGY | Facility: CLINIC | Age: 61
End: 2021-09-10
Attending: INTERNAL MEDICINE
Payer: COMMERCIAL

## 2021-09-10 ENCOUNTER — LAB (OUTPATIENT)
Dept: LAB | Facility: CLINIC | Age: 61
End: 2021-09-10
Attending: INTERNAL MEDICINE
Payer: COMMERCIAL

## 2021-09-10 VITALS
HEIGHT: 66 IN | RESPIRATION RATE: 17 BRPM | DIASTOLIC BLOOD PRESSURE: 74 MMHG | WEIGHT: 110 LBS | SYSTOLIC BLOOD PRESSURE: 112 MMHG | OXYGEN SATURATION: 97 % | HEART RATE: 91 BPM | BODY MASS INDEX: 17.68 KG/M2

## 2021-09-10 DIAGNOSIS — R05.9 COUGH: ICD-10-CM

## 2021-09-10 DIAGNOSIS — J47.9 BRONCHIECTASIS WITHOUT COMPLICATION (H): Primary | ICD-10-CM

## 2021-09-10 DIAGNOSIS — R68.81 EARLY SATIETY: ICD-10-CM

## 2021-09-10 DIAGNOSIS — J47.9 BRONCHIECTASIS WITHOUT COMPLICATION (H): ICD-10-CM

## 2021-09-10 DIAGNOSIS — J32.9 PURULENT POSTNASAL DRAINAGE: ICD-10-CM

## 2021-09-10 LAB
CRP SERPL-MCNC: 15.5 MG/L (ref 0–8)
ERYTHROCYTE [SEDIMENTATION RATE] IN BLOOD BY WESTERGREN METHOD: 62 MM/HR (ref 0–30)
HIV 1+2 AB+HIV1 P24 AG SERPL QL IA: NONREACTIVE

## 2021-09-10 PROCEDURE — 85652 RBC SED RATE AUTOMATED: CPT | Performed by: PATHOLOGY

## 2021-09-10 PROCEDURE — 82785 ASSAY OF IGE: CPT | Performed by: INTERNAL MEDICINE

## 2021-09-10 PROCEDURE — 82784 ASSAY IGA/IGD/IGG/IGM EACH: CPT | Performed by: INTERNAL MEDICINE

## 2021-09-10 PROCEDURE — 86431 RHEUMATOID FACTOR QUANT: CPT | Performed by: INTERNAL MEDICINE

## 2021-09-10 PROCEDURE — G0463 HOSPITAL OUTPT CLINIC VISIT: HCPCS | Mod: 25

## 2021-09-10 PROCEDURE — 94726 PLETHYSMOGRAPHY LUNG VOLUMES: CPT | Performed by: INTERNAL MEDICINE

## 2021-09-10 PROCEDURE — 36415 COLL VENOUS BLD VENIPUNCTURE: CPT | Performed by: PATHOLOGY

## 2021-09-10 PROCEDURE — 86140 C-REACTIVE PROTEIN: CPT | Performed by: PATHOLOGY

## 2021-09-10 PROCEDURE — 94729 DIFFUSING CAPACITY: CPT | Performed by: INTERNAL MEDICINE

## 2021-09-10 PROCEDURE — 94375 RESPIRATORY FLOW VOLUME LOOP: CPT | Performed by: INTERNAL MEDICINE

## 2021-09-10 PROCEDURE — 99205 OFFICE O/P NEW HI 60 MIN: CPT | Mod: 25 | Performed by: INTERNAL MEDICINE

## 2021-09-10 PROCEDURE — 86038 ANTINUCLEAR ANTIBODIES: CPT | Performed by: INTERNAL MEDICINE

## 2021-09-10 PROCEDURE — 87389 HIV-1 AG W/HIV-1&-2 AB AG IA: CPT | Performed by: INTERNAL MEDICINE

## 2021-09-10 RX ORDER — FLUTICASONE PROPIONATE 50 MCG
1 SPRAY, SUSPENSION (ML) NASAL DAILY
Qty: 16 G | Refills: 3 | Status: SHIPPED | OUTPATIENT
Start: 2021-09-10 | End: 2021-12-02

## 2021-09-10 RX ORDER — LEVALBUTEROL 1.25 MG/.5ML
1.25 SOLUTION, CONCENTRATE RESPIRATORY (INHALATION) 2 TIMES DAILY
Qty: 1 EACH | Refills: 3 | Status: SHIPPED | OUTPATIENT
Start: 2021-09-10 | End: 2021-09-21

## 2021-09-10 RX ORDER — LEVALBUTEROL TARTRATE 45 UG/1
2 AEROSOL, METERED ORAL EVERY 4 HOURS PRN
Qty: 15 G | Refills: 3 | Status: SHIPPED | OUTPATIENT
Start: 2021-09-10 | End: 2021-12-02

## 2021-09-10 RX ORDER — SODIUM CHLORIDE FOR INHALATION 3 %
3 VIAL, NEBULIZER (ML) INHALATION 2 TIMES DAILY
Qty: 300 ML | Refills: 3 | Status: SHIPPED | OUTPATIENT
Start: 2021-09-10 | End: 2023-01-26

## 2021-09-10 ASSESSMENT — PAIN SCALES - GENERAL: PAINLEVEL: NO PAIN (0)

## 2021-09-10 ASSESSMENT — MIFFLIN-ST. JEOR: SCORE: 1072.77

## 2021-09-10 NOTE — NURSING NOTE
Chief Complaint   Patient presents with     Consult     New bronchiectas     Medications reviewed and vital signs taken.   Toni Mejia, JESSICA

## 2021-09-10 NOTE — PROGRESS NOTES
[facility]  [facility]  September 16, 2021         Assessment and Plan:   Rosie Zambrano is a 61-year-old female with medical history significant for hypercholesterolemia, bronchiectasis who is referred to the pulmonary clinic for management of her bronchiectasis .  And recent hemoptysis    1.  Bronchiectasis, recent hemoptysis, right middle lobe consolidation with scattered tree-in-bud findings  2.  Multiple small nodules, greatest 6 mm  3.  History of esophageal stenosis status post dilation (last 2014)     Recommendations as follows:  -Start airway clearance with Aerobika at least twice daily for 10-minute sessions; use bronchodilator first and then hypertonic saline  -Start Flonase for chronic postnasal drainage  -Blood work today to evaluate etiologies of bronchiectasis (HIV, CRP, ESR, ALIX, RF, immunoglobulins)  -Sputum culture, provided to patient today to drop off she is able to collect some sputum  -We will follow up pending BAL samples from 8/10  -Full pulmonary function testing today to include total lung capacity as well as diffusion capacity fall  -Nebulizer and supplies were ordered today  -She has received her Covid vaccine and I encouraged her to receive her yearly influenza as well as stay up-to-date with pneumonia shots; she is due for a Prevnar 13 but would prioritize influenza  -She should establish care with GI to evaluate early satiety, coughing after eating    Return to clinic in 3 months.       Soco Alcantar MD  Pulmonary, Allergy, Critical Care, and Sleep Medicine   Pager: [phone]        HPI:    Rosie Zambrano is a 61-year-old female with medical history significant for hypercholesterolemia, bronchiectasis who is referred to the pulmonary clinic for management of her bronchiectasis.  Her daughter is present with her today.      She reports that she was initially diagnosed with bronchiectasis at [facility] and then more recently followed in the  [facility].  Her symptoms were fairly well controlled until around July she started coughing up blood.  She says initially this was 1/4 cup and so she was seen in the emergency department.  The hemoptysis stopped and it was recommended that she see a pulmonary doctor in the [facility] but then she moved to the [facility] per her daughter's urging.    She did have more hemoptysis and underwent an urgent bronc by Dr. Viviana Lizama on August 10.  This bronc note was reviewed and there were no endobronchial lesions no secretions, and the anatomy was normal.  Lavage was performed the right middle lobe with negative cultures at this time.  Fluid was noted to be cloudy and neutrophilic predominant.  After the procedure she did not feel well for 1 to 2 days due to fever and that her symptoms resolved.  She is now back to her baseline which includes regular coughing (typically dry, occasionally productive of green mucus) as well as shortness of breath.  She denies additional episodes of hemoptysis.  She does get dyspnea on exertion primarily when going up stairs or walking up hills.  She also recently did a 2 mile bike ride and became quite winded from this which is abnormal for her.  She does report that she is not very active.    Regards to her infection history she has had pneumonia up to 2 times per year which would require antibiotics.  She was never hospitalized for this.  Her last antibiotic course was approximately 1-1/2 years ago.  Over the last year she has felt as though she cannot take a big deep breath and also that her stomach gets sheth quickly. She has been evaluated by GI for a stenosed esophagus requiring dilation, last in 2014.  Since that procedure she has not had food that get stuck.  Her last EGD was in 2016.  She does report coughing after eating.  Her weight has increased but is now stable.    She has started doing pulmonary rehab for the last 6 weeks has received very little  information about bronchiectasis.  She feels that she is making good progress with this.    She has had postnasal drainage but no sinus infections.  She is not take medicine for this.  She has noticed that dust bothers her breathing.  She denies GERD symptoms.    She is a never smoker but her ex- did smoke within the home.  Her father also smoked within the home.  She denies occupational exposures.           Review of Systems:     A 13 point ROS was performed and was negative except as listed above in the HPI.           Past Medical and Surgical History:     Past Medical History:   Diagnosis Date     Hypercholesterolemia      Past Surgical History:   Procedure Laterality Date     BLADDER SUSPENSION       BRONCHOSCOPY (RIGID OR FLEXIBLE), DIAGNOSTIC N/A 08/10/2021    Procedure: BRONCHOSCOPY, WITH BRONCHOALVEOLAR LAVAGE;  Surgeon: Jia Lizama MD;  Location:  GI     COLONOSCOPY  01/01/2014    repeat 2024     HYSTERECTOMY, VAGINAL       SALPINGO-OOPHORECTOMY BILATERAL       TONSILLECTOMY             Family History:     Family History   Problem Relation Age of Onset     Diabetes Father      Heart Disease Father      Dementia Father      Leukemia Father      Alcoholism Brother      Cancer Paternal Grandmother      Rheumatoid Arthritis Paternal Aunt         x 4 aunts            Social History:     Social History     Socioeconomic History     Marital status:      Spouse name: Not on file     Number of children: Not on file     Years of education: Not on file     Highest education level: Not on file   Occupational History     Not on file   Tobacco Use     Smoking status: Passive Smoke Exposure - Never Smoker     Smokeless tobacco: Never Used   Substance and Sexual Activity     Alcohol use: Not Currently     Drug use: Not Currently     Sexual activity: Not Currently   Other Topics Concern     Not on file   Social History Narrative    Retail      Social Determinants of Health     Financial Resource  "Strain:      Difficulty of Paying Living Expenses:    Food Insecurity:      Worried About Running Out of Food in the Last Year:      Ran Out of Food in the Last Year:    Transportation Needs:      Lack of Transportation (Medical):      Lack of Transportation (Non-Medical):    Physical Activity:      Days of Exercise per Week:      Minutes of Exercise per Session:    Stress:      Feeling of Stress :    Social Connections:      Frequency of Communication with Friends and Family:      Frequency of Social Gatherings with Friends and Family:      Attends [employer] Services:      Active Member of Clubs or Organizations:      Attends Club or Organization Meetings:      Marital Status:    Intimate Partner Violence:      Fear of Current or Ex-Partner:      Emotionally Abused:      Physically Abused:      Sexually Abused:             Medications:     Current Outpatient Medications   Medication     fluticasone (FLONASE) 50 MCG/ACT nasal spray     levalbuterol (XOPENEX CONC) 1.25 MG/0.5ML neb solution     levalbuterol (XOPENEX HFA) 45 MCG/ACT inhaler     sodium chloride (NEBUSAL) 3 % neb solution     No current facility-administered medications for this visit.            Physical Exam:   /74   Pulse 91   Resp 17   Ht 1.664 m (5' 5.5\")   Wt 49.9 kg (110 lb)   SpO2 97%   BMI 18.03 kg/m      Constitutional:   Awake, alert and in no apparent distress     Eyes:   nonicteric     HEENT:   Supple without supraclavicular or cervical lymphadenopathy     Lungs:   Good air flow.  No crackles. No rhonchi.  No wheezes.     Cardiovascular:   Normal S1 and S2.  RRR.  No murmur, gallop or rub.     Musculoskeletal:   No edema, no digital clubbing present     Neurologic:   Alert and conversant.     Skin:   Warm, dry.  No rash on limited exam.             Data:   All laboratory and imaging data reviewed personally.      No results found for: SDES No results found for: CULT     Recent Results (from the past 168 hour(s))   General PFT " Lab (Please always keep checked)    Collection Time: 09/10/21  2:16 PM   Result Value Ref Range    FVC-Pred 3.31 L    FVC-Pre 2.11 L    FVC-%Pred-Pre 63 %    FEV1-Pre 1.66 L    FEV1-%Pred-Pre 64 %    FEV1FVC-Pred 79 %    FEV1FVC-Pre 79 %    FEFMax-Pred 6.47 L/sec    FEFMax-Pre 4.93 L/sec    FEFMax-%Pred-Pre 76 %    FEF2575-Pred 2.30 L/sec    FEF2575-Pre 1.40 L/sec    OKP1475-%Pred-Pre 60 %    ExpTime-Pre 6.73 sec    FIFMax-Pre 2.28 L/sec    FEV1FEV6-Pred 81 %    FEV1FEV6-Pre 78 %    VC-Pred 3.44 L    VC-Pre 2.20 L    VC-%Pred-Pre 64 %    IC-Pred 2.22 L    IC-Pre 1.40 L    IC-%Pred-Pre 63 %    ERV-Pred 1.22 L    ERV-Pre 0.80 L    ERV-%Pred-Pre 65 %    FRCPleth-Pred 2.79 L    FRCPleth-Pre 2.98 L    FRCPleth-%Pred-Pre 106 %    RVPleth-Pred 1.99 L    RVPleth-Pre 2.18 L    RVPleth-%Pred-Pre 109 %    TLCPleth-Pred 5.19 L    TLCPleth-Pre 4.38 L    TLCPleth-%Pred-Pre 84 %    DLCOunc-Pred 21.18 ml/min/mmHg    DLCOunc-Pre 20.65 ml/min/mmHg    DLCOunc-%Pred-Pre 97 %    VA-Pre 3.36 L    VA-%Pred-Pre 65 %    FEV1SVC-Pred 75 %    FEV1SVC-Pre 76 %   IgG    Collection Time: 09/10/21  5:12 PM   Result Value Ref Range    Immunoglobulin G 1,371 610-1,616 mg/dL   IgM    Collection Time: 09/10/21  5:12 PM   Result Value Ref Range    Immunoglobulin M 37 35 - 242 mg/dL   IgE    Collection Time: 09/10/21  5:12 PM   Result Value Ref Range    Immunoglobulin E 31 0 - 114 kU/L   IgA    Collection Time: 09/10/21  5:12 PM   Result Value Ref Range    Immunoglobulin A 256 84 - 499 mg/dL   Rheumatoid factor    Collection Time: 09/10/21  5:12 PM   Result Value Ref Range    Rheumatoid Factor <7 <20 IU/mL   Anti Nuclear Juliet IgG by IFA with Reflex    Collection Time: 09/10/21  5:12 PM   Result Value Ref Range    ALIX interpretation Negative Negative   CRP, inflammation    Collection Time: 09/10/21  5:12 PM   Result Value Ref Range    CRP Inflammation 15.5 (H) 0.0 - 8.0 mg/L   Erythrocyte sedimentation rate auto    Collection Time: 09/10/21  5:12 PM    Result Value Ref Range    Erythrocyte Sedimentation Rate 62 (H) 0 - 30 mm/hr   HIV Antigen Antibody Combo    Collection Time: 09/10/21  5:12 PM   Result Value Ref Range    HIV Antigen Antibody Combo Nonreactive Nonreactive       PFT: Nonspecific pattern on pulmonary function testing with a reduced FEV1, 64% and FVC 63%.  Total lung capacity is normal at 84%.  Diffusion capacity is normal.      Chest CT-8/10/2021-personally reviewed: Consolidation of the anterior right middle lobe with some tree-in-bud plugging of the right lower lobe.  Bronchiectasis.  Scattered calcified granulomas and multiple small nodules noted.     FINDINGS:     Lungs: Right middle lobe consolidative opacity with air bronchograms  located anteriorly (series 3 image 40) additional consolidative  opacity of the right middle lobe anteriorly with surrounding  tree-in-bud opacities. Tree-in-bud opacities/early consolidation  throughout the right middle lobe (series 3 image 32). Mucus plugging  of the subsegmental airways of all lung lobes. The No pleural effusion  or pneumothorax. Scattered calcified granulomas.     Numerous solid and subsolid nodules of the lungs including but not  limited to:  -5 mm groundglass nodule in the left upper lobe (series 3 image 11).  -4 mm subsolid nodule of the left upper lobe laterally (series 3 image  17).  -6 mm solid pulmonary nodule of the left upper lobe posteriorly  (series 3 image 16).     Mediastinum: Heart size is within normal limits. No pericardial  effusion. Normal caliber of the aorta and pulmonary artery. No  abnormal thoracic lymph nodes. Standard branching pattern of the great  vessels.     Bones and soft tissues: No suspicious osseous lesion. Mild  degenerative changes of the thoracolumbar spine.     Upper abdomen:  Limited evaluation of the upper abdomen.                                                                      IMPRESSION:   Consolidative opacities with air bronchograms located in the  anterior  right middle lobe and right lower lobe with surrounding tree-in-bud  opacities with subsegmental airway mucous plugging of all lobes.  Additional scattered bilateral solid, subsolid, and tree-in-bud  nodules are additionally present. The constellation of findings  suggest pneumonia of the right middle and lower lobes secondary to  aspiration or possibly a sequela of nontuberculous mycobacteria.  Recommend continued follow-up until resolution.

## 2021-09-10 NOTE — LETTER
9/10/2021         RE: Alis Zambrano  [id number] [facility] [address]        Dear Colleague,    Thank you for referring your patient, Alis Zambrano, to the [facility] [place]. Please see a copy of my visit note below.    [facility]  September 16, 2021         Assessment and Plan:   Rosie Zambrano is a 61-year-old female with medical history significant for hypercholesterolemia, bronchiectasis who is referred to the pulmonary clinic for management of her bronchiectasis .  And recent hemoptysis    1.  Bronchiectasis, recent hemoptysis, right middle lobe consolidation with scattered tree-in-bud findings  2.  Multiple small nodules, greatest 6 mm  3.  History of esophageal stenosis status post dilation (last 2014)     Recommendations as follows:  -Start airway clearance with Aerobika at least twice daily for 10-minute sessions; use bronchodilator first and then hypertonic saline  -Start Flonase for chronic postnasal drainage  -Blood work today to evaluate etiologies of bronchiectasis (HIV, CRP, ESR, ALIX, RF, immunoglobulins)  -Sputum culture, provided to patient today to drop off she is able to collect some sputum  -We will follow up pending BAL samples from 8/10  -Full pulmonary function testing today to include total lung capacity as well as diffusion capacity fall  -Nebulizer and supplies were ordered today  -She has received her Covid vaccine and I encouraged her to receive her yearly influenza as well as stay up-to-date with pneumonia shots; she is due for a Prevnar 13 but would prioritize influenza  -She should establish care with GI to evaluate early satiety, coughing after eating    Return to clinic in 3 months.       Soco Alcantar MD  Pulmonary, Allergy, Critical Care, and Sleep Medicine   Pager: [phone]        HPI:    Rosie Zambrano is a 61-year-old female  with medical history significant for hypercholesterolemia, bronchiectasis who is referred to the pulmonary clinic for management of her bronchiectasis.  Her daughter is present with her today.      She reports that she was initially diagnosed with bronchiectasis at [place] and then more recently followed in the [facility].  Her symptoms were fairly well controlled until around July she started coughing up blood.  She says initially this was 1/4 cup and so she was seen in the emergency department.  The hemoptysis stopped and it was recommended that she see a pulmonary doctor in the [facility] [place], operated by [facility] but then she moved to the [facility] per her daughter's urging.    She did have more hemoptysis and underwent an urgent bronc by Dr. Viviana Lizama on August 10.  This bronc note was reviewed and there were no endobronchial lesions no secretions, and the anatomy was normal.  Lavage was performed the right middle lobe with negative cultures at this time.  Fluid was noted to be cloudy and neutrophilic predominant.  After the procedure she did not feel well for 1 to 2 days due to fever and that her symptoms resolved.  She is now back to her baseline which includes regular coughing (typically dry, occasionally productive of green mucus) as well as shortness of breath.  She denies additional episodes of hemoptysis.  She does get dyspnea on exertion primarily when going up stairs or walking up hills.  She also recently did a 2 mile bike ride and became quite winded from this which is abnormal for her.  She does report that she is not very active.    Regards to her infection history she has had pneumonia up to 2 times per year which would require antibiotics.  She was never hospitalized for this.  Her last antibiotic course was approximately 1-1/2 years ago.  Over the last year she has felt as though she cannot take a big deep breath and also that her stomach gets sheth quickly. She has been evaluated by GI for a stenosed  esophagus requiring dilation, last in 2014.  Since that procedure she has not had food that get stuck.  Her last EGD was in 2016.  She does report coughing after eating.  Her weight has increased but is now stable.    She has started doing pulmonary rehab for the last 6 weeks has received very little information about bronchiectasis.  She feels that she is making good progress with this.    She has had postnasal drainage but no sinus infections.  She is not take medicine for this.  She has noticed that dust bothers her breathing.  She denies GERD symptoms.    She is a never smoker but her ex- did smoke within the home.  Her father also smoked within the home.  She denies occupational exposures.           Review of Systems:     A 13 point ROS was performed and was negative except as listed above in the HPI.           Past Medical and Surgical History:     Past Medical History:   Diagnosis Date     Hypercholesterolemia      Past Surgical History:   Procedure Laterality Date     BLADDER SUSPENSION       BRONCHOSCOPY (RIGID OR FLEXIBLE), DIAGNOSTIC N/A 08/10/2021    Procedure: BRONCHOSCOPY, WITH BRONCHOALVEOLAR LAVAGE;  Surgeon: Jia Lizama MD;  Location:  GI     COLONOSCOPY  01/01/2014    repeat 2024     HYSTERECTOMY, VAGINAL       SALPINGO-OOPHORECTOMY BILATERAL       TONSILLECTOMY             Family History:     Family History   Problem Relation Age of Onset     Diabetes Father      Heart Disease Father      Dementia Father      Leukemia Father      Alcoholism Brother      Cancer Paternal Grandmother      Rheumatoid Arthritis Paternal Aunt         x 4 aunts            Social History:     Social History     Socioeconomic History     Marital status:      Spouse name: Not on file     Number of children: Not on file     Years of education: Not on file     Highest education level: Not on file   Occupational History     Not on file   Tobacco Use     Smoking status: Passive Smoke Exposure - Never  "Smoker     Smokeless tobacco: Never Used   Substance and Sexual Activity     Alcohol use: Not Currently     Drug use: Not Currently     Sexual activity: Not Currently   Other Topics Concern     Not on file   Social History Narrative    Retail      Social Determinants of Health     Financial Resource Strain:      Difficulty of Paying Living Expenses:    Food Insecurity:      Worried About Running Out of Food in the Last Year:      Ran Out of Food in the Last Year:    Transportation Needs:      Lack of Transportation (Medical):      Lack of Transportation (Non-Medical):    Physical Activity:      Days of Exercise per Week:      Minutes of Exercise per Session:    Stress:      Feeling of Stress :    Social Connections:      Frequency of Communication with Friends and Family:      Frequency of Social Gatherings with Friends and Family:      Attends Sikhism Services:      Active Member of Clubs or Organizations:      Attends Club or Organization Meetings:      Marital Status:    Intimate Partner Violence:      Fear of Current or Ex-Partner:      Emotionally Abused:      Physically Abused:      Sexually Abused:             Medications:     Current Outpatient Medications   Medication     fluticasone (FLONASE) 50 MCG/ACT nasal spray     levalbuterol (XOPENEX CONC) 1.25 MG/0.5ML neb solution     levalbuterol (XOPENEX HFA) 45 MCG/ACT inhaler     sodium chloride (NEBUSAL) 3 % neb solution     No current facility-administered medications for this visit.            Physical Exam:   /74   Pulse 91   Resp 17   Ht 1.664 m (5' 5.5\")   Wt 49.9 kg (110 lb)   SpO2 97%   BMI 18.03 kg/m      Constitutional:   Awake, alert and in no apparent distress     Eyes:   nonicteric     HEENT:   Supple without supraclavicular or cervical lymphadenopathy     Lungs:   Good air flow.  No crackles. No rhonchi.  No wheezes.     Cardiovascular:   Normal S1 and S2.  RRR.  No murmur, gallop or rub.     Musculoskeletal:   No edema, no digital " clubbing present     Neurologic:   Alert and conversant.     Skin:   Warm, dry.  No rash on limited exam.             Data:   All laboratory and imaging data reviewed personally.      No results found for: SDES No results found for: CULT     Recent Results (from the past 168 hour(s))   General PFT Lab (Please always keep checked)    Collection Time: 09/10/21  2:16 PM   Result Value Ref Range    FVC-Pred 3.31 L    FVC-Pre 2.11 L    FVC-%Pred-Pre 63 %    FEV1-Pre 1.66 L    FEV1-%Pred-Pre 64 %    FEV1FVC-Pred 79 %    FEV1FVC-Pre 79 %    FEFMax-Pred 6.47 L/sec    FEFMax-Pre 4.93 L/sec    FEFMax-%Pred-Pre 76 %    FEF2575-Pred 2.30 L/sec    FEF2575-Pre 1.40 L/sec    MGY0294-%Pred-Pre 60 %    ExpTime-Pre 6.73 sec    FIFMax-Pre 2.28 L/sec    FEV1FEV6-Pred 81 %    FEV1FEV6-Pre 78 %    VC-Pred 3.44 L    VC-Pre 2.20 L    VC-%Pred-Pre 64 %    IC-Pred 2.22 L    IC-Pre 1.40 L    IC-%Pred-Pre 63 %    ERV-Pred 1.22 L    ERV-Pre 0.80 L    ERV-%Pred-Pre 65 %    FRCPleth-Pred 2.79 L    FRCPleth-Pre 2.98 L    FRCPleth-%Pred-Pre 106 %    RVPleth-Pred 1.99 L    RVPleth-Pre 2.18 L    RVPleth-%Pred-Pre 109 %    TLCPleth-Pred 5.19 L    TLCPleth-Pre 4.38 L    TLCPleth-%Pred-Pre 84 %    DLCOunc-Pred 21.18 ml/min/mmHg    DLCOunc-Pre 20.65 ml/min/mmHg    DLCOunc-%Pred-Pre 97 %    VA-Pre 3.36 L    VA-%Pred-Pre 65 %    FEV1SVC-Pred 75 %    FEV1SVC-Pre 76 %   IgG    Collection Time: 09/10/21  5:12 PM   Result Value Ref Range    Immunoglobulin G 1,371 610-1,616 mg/dL   IgM    Collection Time: 09/10/21  5:12 PM   Result Value Ref Range    Immunoglobulin M 37 35 - 242 mg/dL   IgE    Collection Time: 09/10/21  5:12 PM   Result Value Ref Range    Immunoglobulin E 31 0 - 114 kU/L   IgA    Collection Time: 09/10/21  5:12 PM   Result Value Ref Range    Immunoglobulin A 256 84 - 499 mg/dL   Rheumatoid factor    Collection Time: 09/10/21  5:12 PM   Result Value Ref Range    Rheumatoid Factor <7 <20 IU/mL   Anti Nuclear Juliet IgG by IFA with Reflex     Collection Time: 09/10/21  5:12 PM   Result Value Ref Range    ALIX interpretation Negative Negative   CRP, inflammation    Collection Time: 09/10/21  5:12 PM   Result Value Ref Range    CRP Inflammation 15.5 (H) 0.0 - 8.0 mg/L   Erythrocyte sedimentation rate auto    Collection Time: 09/10/21  5:12 PM   Result Value Ref Range    Erythrocyte Sedimentation Rate 62 (H) 0 - 30 mm/hr   HIV Antigen Antibody Combo    Collection Time: 09/10/21  5:12 PM   Result Value Ref Range    HIV Antigen Antibody Combo Nonreactive Nonreactive       PFT: Nonspecific pattern on pulmonary function testing with a reduced FEV1, 64% and FVC 63%.  Total lung capacity is normal at 84%.  Diffusion capacity is normal.      Chest CT-8/10/2021-personally reviewed: Consolidation of the anterior right middle lobe with some tree-in-bud plugging of the right lower lobe.  Bronchiectasis.  Scattered calcified granulomas and multiple small nodules noted.     FINDINGS:     Lungs: Right middle lobe consolidative opacity with air bronchograms  located anteriorly (series 3 image 40) additional consolidative  opacity of the right middle lobe anteriorly with surrounding  tree-in-bud opacities. Tree-in-bud opacities/early consolidation  throughout the right middle lobe (series 3 image 32). Mucus plugging  of the subsegmental airways of all lung lobes. The No pleural effusion  or pneumothorax. Scattered calcified granulomas.     Numerous solid and subsolid nodules of the lungs including but not  limited to:  -5 mm groundglass nodule in the left upper lobe (series 3 image 11).  -4 mm subsolid nodule of the left upper lobe laterally (series 3 image  17).  -6 mm solid pulmonary nodule of the left upper lobe posteriorly  (series 3 image 16).     Mediastinum: Heart size is within normal limits. No pericardial  effusion. Normal caliber of the aorta and pulmonary artery. No  abnormal thoracic lymph nodes. Standard branching pattern of the great  vessels.     Bones and  soft tissues: No suspicious osseous lesion. Mild  degenerative changes of the thoracolumbar spine.     Upper abdomen:  Limited evaluation of the upper abdomen.                                                                      IMPRESSION:   Consolidative opacities with air bronchograms located in the anterior  right middle lobe and right lower lobe with surrounding tree-in-bud  opacities with subsegmental airway mucous plugging of all lobes.  Additional scattered bilateral solid, subsolid, and tree-in-bud  nodules are additionally present. The constellation of findings  suggest pneumonia of the right middle and lower lobes secondary to  aspiration or possibly a sequela of nontuberculous mycobacteria.  Recommend continued follow-up until resolution.          Again, thank you for allowing me to participate in the care of your patient.        Sincerely,        Soco Alcantar MD

## 2021-09-10 NOTE — PATIENT INSTRUCTIONS
Rosie,    It was nice meeting you!  We discussed your symptoms and diagnosis of bronchiectasis. I am uncertain of the cause of your bronchiectasis but it can be due to prior lung infection, autoimmune processes, certain types of bacteria, and more.     Recommendations as follows:   - start airway clearance with aerobika twice daily for 10 minutes; use the levalbuterol nebulizer first, followed by the hypertonic saline neb and then use the aerobika device  - start flonase nasal spray to control post-nasal drainage  - we will do blood work today  - we will perform full pulmonary function testing today  - I have ordered a nebulizer and nebulizer supplies for you today   - stay up to date with vaccines including the prevnar 13, yearly flu, and covid vaccines   -Establish care with GI  - Please call the pulmonary clinic with any questions. The pulmonary clinic number is: [phone]    Stay up to date with vaccinations including your yearly flu vaccine. Please continue to social distance which does not exclude going outside and enjoying our wonderful weather! Wash your hands regularly. Wear a mask when unable to social distance (such as in the grocery store).  I am pleased to know that you received the COVID-19 vaccine.     Have a nice summer and stay well! Please let me know if you have questions or concerns.     Pastora Alcantar MD  Pulmonary, Allergy, Critical Care, and Sleep Medicine   [facility]         Patient Education     Understanding Bronchiectasis   Bronchiectasis is a condition in which the lungs' airways (the bronchi and bronchioles) are damaged and become wider than normal. Over time, the walls of the airways become thick and scarred. The damaged airways can t clear mucus as well. Because of this, mucus builds up in the airways. This increases the risk for lung infections. Bronchiectasis is a long-term (chronic) condition.   What causes bronchiectasis?  Experts don't know exactly  what causes this condition. Bronchiectasis can be present at birth (congenital). Or it may happen later in life (acquired). It can occur at any age. It also occurs in people with lung infections who have long-term damage to the airways from other health problems.   Smokers and those with long-term lung disease are more likely to develop bronchiectasis. Some of the conditions that increase the chance for bronchiectasis include:     Cystic fibrosis    Lung infections such as pneumonia, tuberculosis, or whooping cough    Chronic obstructive pulmonary disease (COPD)    Problems with the body s disease-fighting (immune) system    Allergic bronchopulmonary aspergillosis (ABPA)    Long-term problem with inhaling food or liquids into the lungs (aspiration)    Certain autoimmune diseases such as rheumatoid arthritis, Sjögren s syndrome, and Crohn s disease    Disorders that affect cilia function, such as primary ciliary dyskinesia    Blocked airway, such as from a tumor or inhaled object    Lung problems that are present at birth (congenital)  What are the symptoms of bronchiectasis?  Damage to the airways often starts in childhood. You may not have symptoms until months or years after repeated lung infections. Some people have few or no symptoms. Others have daily symptoms that get worse over time. Common symptoms include:     Long-term cough that occurs daily and lasts over months or years    Coughing up a lot of thick mucus that may have blood, trapped particles, and pus in it    Trouble breathing including shortness of breath and a whistling sound when you breathe (wheezing)    Inflammation of the nose and sinuses (rhinosinusitis)    Inflammation of the covering of the lungs (pleurisy or pleuritis)    Feeling tired    Chest pain    Flesh under fingers and toes gets thicker (clubbing)    Weight loss or abnormal growth rate in children  How is bronchiectasis diagnosed?  Your healthcare provider will ask about your past  health and symptoms. You ll also have a physical exam. This includes listening to your chest with a stethoscope. You may need tests to help with the diagnosis, including:     Blood tests.  These check for infection, immune system problems, and overall health.    Sputum culture. This is a test of the mucus in your lungs. It s checked for a bacterial or fungal infection.    Chest X-ray.  This is done to get information about your heart and lungs.    Chest CT scan.  This gives detailed pictures of your airway. It s most often used to make the diagnosis.    Lung function and exercise tests.  They include spirometry and a 6-minute walk test. These tests show how well your lungs work and if you are able to get enough oxygen into your body, even when exerting yourself.    Sweat test. This or other tests may be done to diagnose cystic fibrosis.  Her[employer] last reviewed this educational content on 12/1/2019 2000-2021 [employer]. All rights reserved. This information is not intended as a substitute for professional medical care. Always follow your healthcare professional's instructions.           Patient Education     Tips to Control Acid Reflux    To control acid reflux, you ll need to make some basic diet and lifestyle changes. The simple steps outlined below may be all you ll need to ease discomfort.   Watch what you eat    Don't have fatty foods or spicy foods.    Eat fewer acidic foods, such as citrus and tomato-based foods. These can increase symptoms.    Limit drinking alcohol, caffeine, and fizzy beverages. All increase acid reflux.    Try limiting chocolate, peppermint, and spearmint. These can make acid reflux worse in some people.    Watch when you eat    Don't lie down for 3 hours after eating.    Don't snack before going to bed.    Raise your head  Raising your head and upper body by 4 to 6 inches helps limit reflux when you re lying down. Put blocks under the head of your bed frame or a wedge  under your mattress to raise it.   Other changes    Lose weight, if you need to    Don t exercise near bedtime    Don't wear tight-fitting clothes    Limit aspirin and ibuprofen    Stop smoking    [employer] last reviewed this educational content on 6/1/2019 2000-2021 [employer]. All rights reserved. This information is not intended as a substitute for professional medical care. Always follow your healthcare professional's instructions.           Patient Education     Pursed-Lip Breathing  What does pursed-lip breathing do?  If you are short of breath, this exercise can slow your breathing and help you breathe better. It will:    Keep your airway open longer.    Help you regain control if you have trouble catching your breath.    Make it easier to breathe.    Move old air out of your lungs and let new air in.    Make you feel more relaxed.  When should I use pursed-lip breathing?  You should use it when you:    Are short of breath.    Feel anxious or stressed.    Are active. For example:  ? Walking or climbing stairs  ? Bending or reaching  ? Bathing, getting dressed or doing housework.  How is it done?  Breathe in:  Relax your neck and shoulders. Then, breathe in slowly through your nose for at least 2 counts.    Breathe out:  Purse your lips as if you were going to whistle or blow out a candle. Then, breathe out slowly and gently through your pursed lips for at least 4 counts.    Tips    Breathe out for a longer time than you breathe in.    Do not hold your breath.    It will get easier with practice.  For informational purposes only. Not to replace the advice of your health care provider.  Copyright   2008 [employer]. All rights reserved. [employer] 007332 - REV 12/16.  For informational purposes only. Not to replace the advice of your health care provider.  Copyright   2018 [employer]. All rights reserved.

## 2021-09-13 LAB
IGA SERPL-MCNC: 256 MG/DL (ref 84–499)
IGE SERPL-ACNC: 31 KU/L (ref 0–114)
IGG SERPL-MCNC: 1371 MG/DL (ref 610–1616)
IGM SERPL-MCNC: 37 MG/DL (ref 35–242)
RHEUMATOID FACT SER NEPH-ACNC: <7 IU/ML

## 2021-09-14 LAB
ANA SER QL IF: NEGATIVE
DLCOUNC-%PRED-PRE: 97 %
DLCOUNC-PRE: 20.65 ML/MIN/MMHG
DLCOUNC-PRED: 21.18 ML/MIN/MMHG
ERV-%PRED-PRE: 65 %
ERV-PRE: 0.8 L
ERV-PRED: 1.22 L
EXPTIME-PRE: 6.73 SEC
FEF2575-%PRED-PRE: 60 %
FEF2575-PRE: 1.4 L/SEC
FEF2575-PRED: 2.3 L/SEC
FEFMAX-%PRED-PRE: 76 %
FEFMAX-PRE: 4.93 L/SEC
FEFMAX-PRED: 6.47 L/SEC
FEV1-%PRED-PRE: 64 %
FEV1-PRE: 1.66 L
FEV1FEV6-PRE: 78 %
FEV1FEV6-PRED: 81 %
FEV1FVC-PRE: 79 %
FEV1FVC-PRED: 79 %
FEV1SVC-PRE: 76 %
FEV1SVC-PRED: 75 %
FIFMAX-PRE: 2.28 L/SEC
FRCPLETH-%PRED-PRE: 106 %
FRCPLETH-PRE: 2.98 L
FRCPLETH-PRED: 2.79 L
FVC-%PRED-PRE: 63 %
FVC-PRE: 2.11 L
FVC-PRED: 3.31 L
IC-%PRED-PRE: 63 %
IC-PRE: 1.4 L
IC-PRED: 2.22 L
RVPLETH-%PRED-PRE: 109 %
RVPLETH-PRE: 2.18 L
RVPLETH-PRED: 1.99 L
TLCPLETH-%PRED-PRE: 84 %
TLCPLETH-PRE: 4.38 L
TLCPLETH-PRED: 5.19 L
VA-%PRED-PRE: 65 %
VA-PRE: 3.36 L
VC-%PRED-PRE: 64 %
VC-PRE: 2.2 L
VC-PRED: 3.44 L

## 2021-09-16 ENCOUNTER — TELEPHONE (OUTPATIENT)
Dept: PULMONOLOGY | Facility: CLINIC | Age: 61
End: 2021-09-16

## 2021-09-16 NOTE — TELEPHONE ENCOUNTER
Prior Authorization Retail Medication Request    Medication/Dose: Xopenex HFA 45MCG/ACT   ICD code (if different than what is on RX):    Previously Tried and Failed:    Rationale:      Insurance Name:    Insurance ID:        Pharmacy Information (if different than what is on RX)  Name:  [facility]   Phone:

## 2021-09-16 NOTE — TELEPHONE ENCOUNTER
Central Prior Authorization Team   Phone: [phone]    PA Initiation    Medication: Xopenex HFA 45MCG/ACT   Insurance Company: [employer] - Phone [phone] Fax [phone]  Pharmacy Filling the Rx: [place], [place] - [address] [place] [address] [phone]  Filling Pharmacy Phone: [phone]  Filling Pharmacy Fax: [phone]  Start Date: 9/16/2021

## 2021-09-17 ENCOUNTER — LAB (OUTPATIENT)
Dept: LAB | Facility: CLINIC | Age: 61
End: 2021-09-17
Payer: COMMERCIAL

## 2021-09-17 DIAGNOSIS — J47.9 BRONCHIECTASIS WITHOUT COMPLICATION (H): ICD-10-CM

## 2021-09-17 DIAGNOSIS — J47.9 BRONCHIECTASIS WITHOUT COMPLICATION (H): Primary | ICD-10-CM

## 2021-09-17 PROCEDURE — 87205 SMEAR GRAM STAIN: CPT

## 2021-09-18 LAB
BACTERIA SPT CULT: NORMAL
GRAM STAIN RESULT: NORMAL

## 2021-09-19 ENCOUNTER — TELEPHONE (OUTPATIENT)
Dept: PULMONOLOGY | Facility: CLINIC | Age: 61
End: 2021-09-19

## 2021-09-19 DIAGNOSIS — J47.9 BRONCHIECTASIS (H): Primary | ICD-10-CM

## 2021-09-19 NOTE — TELEPHONE ENCOUNTER
Prior Authorization Retail Medication Request     Medication/Dose: levalbuterol 1.25 nebs     Insurance Name:[facility]    Insurance ID:  [id number]      Karie Frausto [facility] [place]  [phone]

## 2021-09-20 NOTE — TELEPHONE ENCOUNTER
REFERRAL INFORMATION:    Referring Provider:  Dr. Zeferino Alcantar     Referring Clinic:  [facility]     Reason for Visit/Diagnosis: History of Dysphagia, Esophageal stenosis      FUTURE VISIT INFORMATION:    Appointment Date: 10/22/2021    Appointment Time: 9 AM      NOTES STATUS DETAILS   OFFICE NOTE from Referring Provider Internal 9/10/2021 Office visit with Dr. Alcantar     OFFICE NOTE from Other Specialist Care Everywhere 5/27/14 Office visit with Jocelyn Kimball LPN ([facility])     5/15/14 Office visit with Dr. Chanelle Babin ([facility])     HOSPITAL DISCHARGE SUMMARY/  ED VISITS Care Everywhere 3/24/18, 4/29/14 ([facility])  10/14/13 ([place])      OPERATIVE REPORT N/A    MEDICATION LIST Internal         ENDOSCOPY  N/A    COLONOSCOPY N/A    ERCP N/A    EUS Internal    STOOL TESTING N/A    PERTINENT LABS Internal/ Care Everywhere    PATHOLOGY REPORTS (RELATED) N/A    IMAGING (CT, MRI, EGD, MRCP, Small Bowel Follow Through/SBT, MR/CT Enterography) N/A

## 2021-09-20 NOTE — TELEPHONE ENCOUNTER
Central Prior Authorization Team   Phone: [phone]    PA Initiation    Medication: levalbuterol 1.25 mg nebs  Insurance Company: [employer] - Phone [phone] Fax [phone]  Pharmacy Filling the Rx: [facility] [place], [place] - [address]  Filling Pharmacy Phone: [phone]  Filling Pharmacy Fax:    Start Date: 9/20/2021

## 2021-09-20 NOTE — TELEPHONE ENCOUNTER
Prior Authorization Not Needed per Insurance    Medication: Xopenex HFA 45MCG/ACT-PA NOT NEEDED   Insurance Company: [employer] - Phone [phone] Fax [phone]  Expected CoPay:      Pharmacy [place]illing the Rx: [facility] [place] - [address] [facility]  Pharmacy Notified: Yes  Patient Notified: No    Called pharmacy and pharmacy stated that PA is Not Needed and Brand Xopenex HFA is covered. Pharmacy stated that they have a paid claim on medication Brand Xopenex HFA and will notify patient when medication is ready for . **Be Advised: generic Levalbuterol Tartrate is Denied. Preferred product(s): Xopenex HFA, Albuerol HFA (made by Cipla, Par, Perrigo, Teva, Pracsco, or Lupin), Proair RespiClick and ProAir HFA, Proventil HFA, Ventolin HFA as noted below.**

## 2021-09-21 RX ORDER — ALBUTEROL SULFATE 0.83 MG/ML
2.5 SOLUTION RESPIRATORY (INHALATION) 2 TIMES DAILY
Qty: 180 ML | Refills: 3 | Status: SHIPPED | OUTPATIENT
Start: 2021-09-21 | End: 2023-01-26

## 2021-09-21 NOTE — TELEPHONE ENCOUNTER
PRIOR AUTHORIZATION DENIED    Medication: levalbuterol 1.25 mg nebs    Denial Date: 9/21/2021    Denial Rational: Must try/fail at least two preferred drugs Albuterol (nebulizer solution, HFA), Proair (HFA, Respiclick), Proventil (HFA), Ventolin (HFA), Xopenex HFA (just started).   Albuterol nebulizer solution is preferred.  Insurance was made aware Ipratropium causes side effects for patient but was still denied. No chart notes indicated patient had side effects of albuterol (tachycardia, dizziness, etc).           Appeal Information: This medication was denied. If physician would like to appeal because patient has contraindication or allergy to covered medication please write letter of medical necessity and route back to PA team to initiate.  If no further action is needed please close encounter thank you.

## 2021-09-27 ENCOUNTER — IMMUNIZATION (OUTPATIENT)
Dept: FAMILY MEDICINE | Facility: CLINIC | Age: 61
End: 2021-09-27
Payer: COMMERCIAL

## 2021-09-27 PROCEDURE — 90471 IMMUNIZATION ADMIN: CPT

## 2021-09-27 PROCEDURE — 90682 RIV4 VACC RECOMBINANT DNA IM: CPT

## 2021-10-06 LAB — ACID FAST STAIN (ARUP): NORMAL

## 2021-10-22 ENCOUNTER — PRE VISIT (OUTPATIENT)
Dept: GASTROENTEROLOGY | Facility: CLINIC | Age: 61
End: 2021-10-22

## 2021-10-22 ENCOUNTER — VIRTUAL VISIT (OUTPATIENT)
Dept: GASTROENTEROLOGY | Facility: CLINIC | Age: 61
End: 2021-10-22
Attending: INTERNAL MEDICINE
Payer: COMMERCIAL

## 2021-10-22 VITALS — BODY MASS INDEX: 18 KG/M2 | HEIGHT: 66 IN | WEIGHT: 112 LBS

## 2021-10-22 DIAGNOSIS — R68.81 EARLY SATIETY: ICD-10-CM

## 2021-10-22 DIAGNOSIS — R05.9 COUGH: ICD-10-CM

## 2021-10-22 DIAGNOSIS — R13.19 ESOPHAGEAL DYSPHAGIA: Primary | ICD-10-CM

## 2021-10-22 PROCEDURE — 99204 OFFICE O/P NEW MOD 45 MIN: CPT | Mod: 95 | Performed by: PHYSICIAN ASSISTANT

## 2021-10-22 ASSESSMENT — MIFFLIN-ST. JEOR: SCORE: 1081.84

## 2021-10-22 NOTE — NURSING NOTE
"Chief Complaint   Patient presents with     New Patient       Vitals:    10/22/21 0831   Weight: 50.8 kg (112 lb)   Height: 1.664 m (5' 5.5\")       Body mass index is 18.35 kg/m .    Montse Shaw CMA              "

## 2021-10-22 NOTE — PATIENT INSTRUCTIONS
It was a pleasure taking care of you today.  I've included a brief summary of our discussion and care plan from today's visit below.  Please review this information with your primary care provider.  _______________________________________________________________________    My recommendations are summarized as follows:    -- Barium esophagram and video swallow test at your convenience. To schedule imaging, please call [phone]   -- Next steps following this result.    Return to GI Clinic in 2-3 months to review your progress.    ______________________________________________________________________    How do I schedule labs, imaging studies, or procedures that were ordered in clinic today?     Labs: To schedule lab appointment at the [facility], use my chart or call [phone]. If you have a [facility] lab closer to home where you are regularly seen you can give them a call.     Procedures: If a colonoscopy, upper endoscopy, breath test, esophageal manometry, or pH impedence was ordered today, our endoscopy team will call you to schedule this. If you have not heard from our endoscopy team within a week, please call [phone] to schedule.     Imaging Studies: If you were scheduled for a CT scan, X-ray, MRI, ultrasound, HIDA scan or other imaging study, please call [phone] to have this scheduled.     Referral: If a referral to another specialty was ordered, expect a phone call or follow instructions above. If you have not heard from anyone regarding your referral in a week, please call our clinic to check the status.     Who do I call with any questions after my visit?  Please be in touch if there are any further questions that arise following today's visit.  There are multiple ways to contact your gastroenterology care team.        During business hours, you may reach a Gastroenterology nurse at [phone]      To schedule or reschedule an appointment, please call [phone].       You  can always send a secure message through DNA13.  DNA13 messages are answered by your nurse or doctor typically within 24 hours.  Please allow extra time on weekends and holidays.        For urgent/emergent questions after business hours, you may reach the on-call GI Fellow by contacting the [facility] [place] [place]  at [phone].     How will I get the results of any tests ordered?    You will receive all of your results.  If you have signed up for [employer], any tests ordered at your visit will be available to you after your physician reviews them.  Typically this takes 1-2 weeks.  If there are urgent results that require a change in your care plan, your physician or nurse will call you to discuss the next steps.      What is DNA13?  DNA13 is a secure way for you to access all of your healthcare records from the [facility].  It is a web based computer program, so you can sign on to it from any location.  It also allows you to send secure messages to your care team.  I recommend signing up for DNA13 access if you have not already done so and are comfortable with using a computer.      How to I schedule a follow-up visit?  If you did not schedule a follow-up visit today, please call [phone] to schedule a follow-up office visit.      Sincerely,    Marine Renae PA-C  Division of Gastroenterology, Hepatology & Nutrition  [facility]

## 2021-10-22 NOTE — PROGRESS NOTES
"Alis Zambrano is a 61 year old female who is being evaluated via a billable video visit.      The patient has been notified of following:     \"This video visit will be conducted via a call between you and your physician/provider. We have found that certain health care needs can be provided without the need for an in-person physical exam.  This service lets us provide the care you need with a video conversation.  If a prescription is necessary we can send it directly to your pharmacy.  If lab work is needed we can place an order for that and you can then stop by our lab to have the test done at a later time.    If during the course of the call the physician/provider feels a video visit is not appropriate, you will not be charged for this service.\"     Patient confirmed that they are in [place] for today's visit yes.    Video-Visit Details  Type of service:  Video Visit    Video Start Time: 847  Video End Time:  911      Originating Location (pt. Location): Home    Distant Location (provider location):  [facility] [place]     Platform used: Blucarat            "

## 2021-10-22 NOTE — LETTER
10/22/2021         RE: Alis Zambrano  [address] [address]        Dear Colleague,    Thank you for referring your patient, Alis Zambrano, to the [facility] [place]. Please see a copy of my visit note below.    GI CLINIC VISIT    CC/REFERRING PROVIDER: Soco Alcantar  REASON FOR CONSULTATION: dysphagia    HPI: 61 year old female with PMH of bronchiectasis presenting to GI clinic for dysphagia.    Rosie has a history of dysphagia to solids that started several years ago, with work-up as summarized below.    Esophagram 2013 normal  EGD 4/2014 - procedure aborted due to unclear process in the posterior OP interfering with advancement of scope  EGD 5/2014 - passed with ease past the UES, esophagus appeared normal  HRM 5/2014 - no records, but can see that her GI provider discussed NEMD with her    She was then evaluated at [facility]. We do not have records, but she reports a repeat EGD was done and she was told the diameter of her esophagus was that of her pinky finger. A balloon dilation was performed and she had relief of symptoms. She does report that esophageal biopsies were obtained and unremarkable. Now, she remains without any dysphagia to solids or liquids. However, she has to thoroughly chew her food, which takes a long time, to ensure it goes down. This doesn't bother her too much because she is very used to doing this.    No heartburn, reflux. Rare regurgitation. She coughs after meals, but not during initiation of swallow. She has historically not tolerated PPI at previous attempts to address possible silent reflux.    Family history of esophageal web    ROS: 10pt ROS performed and otherwise negative.    PAST MEDICAL HISTORY:  Past Medical History:   Diagnosis Date     Hypercholesterolemia        PREVIOUS ABDOMINAL/GYNECOLOGIC SURGERIES:  Past Surgical History:   Procedure Laterality Date     BLADDER SUSPENSION        BRONCHOSCOPY (RIGID OR FLEXIBLE), DIAGNOSTIC N/A 08/10/2021    Procedure: BRONCHOSCOPY, WITH BRONCHOALVEOLAR LAVAGE;  Surgeon: Jia Lizama MD;  Location:  GI     COLONOSCOPY  01/01/2014    repeat 2024     HYSTERECTOMY, VAGINAL       SALPINGO-OOPHORECTOMY BILATERAL       TONSILLECTOMY           PERTINENT MEDICATIONS:  Current Outpatient Medications   Medication Sig Dispense Refill     albuterol (PROVENTIL) (2.5 MG/3ML) 0.083% neb solution Take 1 vial (2.5 mg) by nebulization 2 times daily 180 mL 3     fluticasone (FLONASE) 50 MCG/ACT nasal spray Spray 1 spray into both nostrils daily 16 g 3     levalbuterol (XOPENEX HFA) 45 MCG/ACT inhaler Inhale 2 puffs into the lungs every 4 hours as needed for shortness of breath / dyspnea or wheezing 15 g 3     sodium chloride (NEBUSAL) 3 % neb solution Take 3 mLs by nebulization 2 times daily 300 mL 3     SOCIAL HISTORY:  Social History     Socioeconomic History     Marital status:      Spouse name: Not on file     Number of children: Not on file     Years of education: Not on file     Highest education level: Not on file   Occupational History     Not on file   Tobacco Use     Smoking status: Passive Smoke Exposure - Never Smoker     Smokeless tobacco: Never Used   Substance and Sexual Activity     Alcohol use: Not Currently     Drug use: Not Currently     Sexual activity: Not Currently   Other Topics Concern     Not on file   Social History Narrative    Retail      Social Determinants of Health     Financial Resource Strain:      Difficulty of Paying Living Expenses:    Food Insecurity:      Worried About Running Out of Food in the Last Year:      Ran Out of Food in the Last Year:    Transportation Needs:      Lack of Transportation (Medical):      Lack of Transportation (Non-Medical):    Physical Activity:      Days of Exercise per Week:      Minutes of Exercise per Session:    Stress:      Feeling of Stress :    Social Connections:      Frequency of  Communication with Friends and Family:      Frequency of Social Gatherings with Friends and Family:      Attends Mosque Services:      Active Member of Clubs or Organizations:      Attends Club or Organization Meetings:      Marital Status:    Intimate Partner Violence:      Fear of Current or Ex-Partner:      Emotionally Abused:      Physically Abused:      Sexually Abused:        FAMILY HISTORY:    Family History   Problem Relation Age of Onset     Diabetes Father      Heart Disease Father      Dementia Father      Leukemia Father      Alcoholism Brother      Cancer Paternal Grandmother      Rheumatoid Arthritis Paternal Aunt         x 4 aunts       PHYSICAL EXAMINATION:  Vitals reviewed  There were no vitals taken for this visit.  Video physical exam  General: Patient appears well in no acute distress.   Skin: No visualized rash or lesions on visualized skin  Eyes: EOMI, no erythema, sclera icterus or discharge noted  Resp: Appears to be breathing comfortably without accessory muscle usage, speaking in full sentences, no cough  MSK: Appears to have normal range of motion based on visualized movements  Neurologic: No apparent tremors, facial movements symmetric  Psych: affect normal, alert and oriented    The rest of a comprehensive physical examination is deferred due to PHE (public health emergency) video restrictions      PERTINENT STUDIES Reviewed in EMR    ASSESSMENT/PLAN:    # History of dysphagia s/p esophogeal dilation  # Coughing postprandially    History of dysphagia to solids, with 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 Previous HRM with nonspecific motility disorder. Subsequent manometry testing was deferred as it was poorly tolerated. We do not have records, however she was told biopsies were negative for EoE. Since that time, she has not had recurrence of dysphagia, however requires excessive mastication to prevent symptoms.  She denies any history of GERD symptoms,  and historically has not tolerated PPI for possible silent reflux contributing to postprandial coughing, which does not occur with swallow initiation, and inconsistent with oropharyngeal dysphagia.    Discussed with Rosie that it is possible that she continues to have a narrow caliber esophagus, given that she was told it was severely narrow, a single balloon dilation was unlikely to distend the esophagus to near-normal range. However, she has not had recurrent symptoms and the excessive chewing is not interfering with her life.  We discuss first performing a VSS with barium esophagram to screen for aspiration, dysmotility, or narrowing. Following results of this, we can re-discuss whether or not she would like to proceed with EGD with possible dilation.      RTC 2-3 months    Thank you for this consultation. It was a pleasure to participate in the care of this patient; please contact us with any further questions.    Marine Renae PA-C    45 minutes spent on the date of the encounter doing chart review, review of test results, patient visit and documentation        Again, thank you for allowing me to participate in the care of your patient.      Sincerely,    Marine Renae PA-C

## 2021-10-22 NOTE — PROGRESS NOTES
GI CLINIC VISIT    CC/REFERRING PROVIDER: Soco Alcantar  REASON FOR CONSULTATION: dysphagia    HPI: 61 year old female with PMH of bronchiectasis presenting to GI clinic for dysphagia.    Rosie has a history of dysphagia to solids that started several years ago, with work-up as summarized below.    Esophagram 2013 normal  EGD 4/2014 - procedure aborted due to unclear process in the posterior OP interfering with advancement of scope  EGD 5/2014 - passed with ease past the UES, esophagus appeared normal  HRM 5/2014 - no records, but can see that her GI provider discussed NEMD with her    She was then evaluated at [facility]. We do not have records, but she reports a repeat EGD was done and she was told the diameter of her esophagus was that of her pinky finger. A balloon dilation was performed and she had relief of symptoms. She does report that esophageal biopsies were obtained and unremarkable. Now, she remains without any dysphagia to solids or liquids. However, she has to thoroughly chew her food, which takes a long time, to ensure it goes down. This doesn't bother her too much because she is very used to doing this.    No heartburn, reflux. Rare regurgitation. She coughs after meals, but not during initiation of swallow. She has historically not tolerated PPI at previous attempts to address possible silent reflux.    Family history of esophageal web    ROS: 10pt ROS performed and otherwise negative.    PAST MEDICAL HISTORY:  Past Medical History:   Diagnosis Date     Hypercholesterolemia        PREVIOUS ABDOMINAL/GYNECOLOGIC SURGERIES:  Past Surgical History:   Procedure Laterality Date     BLADDER SUSPENSION       BRONCHOSCOPY (RIGID OR FLEXIBLE), DIAGNOSTIC N/A 08/10/2021    Procedure: BRONCHOSCOPY, WITH BRONCHOALVEOLAR LAVAGE;  Surgeon: Jia Lizama MD;  Location:  GI     COLONOSCOPY  01/01/2014    repeat 2024     HYSTERECTOMY, VAGINAL       SALPINGO-OOPHORECTOMY BILATERAL        TONSILLECTOMY           PERTINENT MEDICATIONS:  Current Outpatient Medications   Medication Sig Dispense Refill     albuterol (PROVENTIL) (2.5 MG/3ML) 0.083% neb solution Take 1 vial (2.5 mg) by nebulization 2 times daily 180 mL 3     fluticasone (FLONASE) 50 MCG/ACT nasal spray Spray 1 spray into both nostrils daily 16 g 3     levalbuterol (XOPENEX HFA) 45 MCG/ACT inhaler Inhale 2 puffs into the lungs every 4 hours as needed for shortness of breath / dyspnea or wheezing 15 g 3     sodium chloride (NEBUSAL) 3 % neb solution Take 3 mLs by nebulization 2 times daily 300 mL 3     SOCIAL HISTORY:  Social History     Socioeconomic History     Marital status:      Spouse name: Not on file     Number of children: Not on file     Years of education: Not on file     Highest education level: Not on file   Occupational History     Not on file   Tobacco Use     Smoking status: Passive Smoke Exposure - Never Smoker     Smokeless tobacco: Never Used   Substance and Sexual Activity     Alcohol use: Not Currently     Drug use: Not Currently     Sexual activity: Not Currently   Other Topics Concern     Not on file   Social History Narrative    Retail      Social Determinants of Health     Financial Resource Strain:      Difficulty of Paying Living Expenses:    Food Insecurity:      Worried About Running Out of Food in the Last Year:      Ran Out of Food in the Last Year:    Transportation Needs:      Lack of Transportation (Medical):      Lack of Transportation (Non-Medical):    Physical Activity:      Days of Exercise per Week:      Minutes of Exercise per Session:    Stress:      Feeling of Stress :    Social Connections:      Frequency of Communication with Friends and Family:      Frequency of Social Gatherings with Friends and Family:      Attends Latter day Services:      Active Member of Clubs or Organizations:      Attends Club or Organization Meetings:      Marital Status:    Intimate Partner Violence:      Fear of  Current or Ex-Partner:      Emotionally Abused:      Physically Abused:      Sexually Abused:        FAMILY HISTORY:    Family History   Problem Relation Age of Onset     Diabetes Father      Heart Disease Father      Dementia Father      Leukemia Father      Alcoholism Brother      Cancer Paternal Grandmother      Rheumatoid Arthritis Paternal Aunt         x 4 aunts       PHYSICAL EXAMINATION:  Vitals reviewed  There were no vitals taken for this visit.  Video physical exam  General: Patient appears well in no acute distress.   Skin: No visualized rash or lesions on visualized skin  Eyes: EOMI, no erythema, sclera icterus or discharge noted  Resp: Appears to be breathing comfortably without accessory muscle usage, speaking in full sentences, no cough  MSK: Appears to have normal range of motion based on visualized movements  Neurologic: No apparent tremors, facial movements symmetric  Psych: affect normal, alert and oriented    The rest of a comprehensive physical examination is deferred due to PHE (public health emergency) video restrictions      PERTINENT STUDIES Reviewed in EMR    ASSESSMENT/PLAN:    # History of dysphagia s/p esophogeal dilation  # Coughing postprandially    History of dysphagia to solids, with 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 Previous HRM with nonspecific motility disorder. Subsequent manometry testing was deferred as it was poorly tolerated. We do not have records, however she was told biopsies were negative for EoE. Since that time, she has not had recurrence of dysphagia, however requires excessive mastication to prevent symptoms.  She denies any history of GERD symptoms, and historically has not tolerated PPI for possible silent reflux contributing to postprandial coughing, which does not occur with swallow initiation, and inconsistent with oropharyngeal dysphagia.    Discussed with Rosie that it is possible that she continues to have a narrow caliber  esophagus, given that she was told it was severely narrow, a single balloon dilation was unlikely to distend the esophagus to near-normal range. However, she has not had recurrent symptoms and the excessive chewing is not interfering with her life.  We discuss first performing a VSS with barium esophagram to screen for aspiration, dysmotility, or narrowing. Following results of this, we can re-discuss whether or not she would like to proceed with EGD with possible dilation.      RTC 2-3 months    Thank you for this consultation. It was a pleasure to participate in the care of this patient; please contact us with any further questions.    Marine Renae PA-C    45 minutes spent on the date of the encounter doing chart review, review of test results, patient visit and documentation

## 2021-11-05 ENCOUNTER — ANCILLARY PROCEDURE (OUTPATIENT)
Dept: GENERAL RADIOLOGY | Facility: CLINIC | Age: 61
End: 2021-11-05
Attending: PHYSICIAN ASSISTANT
Payer: COMMERCIAL

## 2021-11-05 ENCOUNTER — THERAPY VISIT (OUTPATIENT)
Dept: SPEECH THERAPY | Facility: CLINIC | Age: 61
End: 2021-11-05
Attending: PHYSICIAN ASSISTANT
Payer: COMMERCIAL

## 2021-11-05 DIAGNOSIS — R13.19 ESOPHAGEAL DYSPHAGIA: ICD-10-CM

## 2021-11-05 PROCEDURE — 92611 MOTION FLUOROSCOPY/SWALLOW: CPT | Mod: GN | Performed by: SPEECH-LANGUAGE PATHOLOGIST

## 2021-11-05 PROCEDURE — 92610 EVALUATE SWALLOWING FUNCTION: CPT | Mod: GN | Performed by: SPEECH-LANGUAGE PATHOLOGIST

## 2021-11-05 PROCEDURE — 74230 X-RAY XM SWLNG FUNCJ C+: CPT | Mod: GC | Performed by: RADIOLOGY

## 2021-11-05 PROCEDURE — 99207 XR ESOPHAGRAM: CPT | Performed by: RADIOLOGY

## 2021-11-05 RX ORDER — BARIUM SULFATE 400 MG/ML
SUSPENSION ORAL ONCE
Status: COMPLETED | OUTPATIENT
Start: 2021-11-05 | End: 2021-11-05

## 2021-11-05 RX ORDER — BARIUM SULFATE 400 MG/ML
SUSPENSION ORAL ONCE
Status: ACTIVE | OUTPATIENT
Start: 2021-11-05

## 2021-11-05 RX ADMIN — BARIUM SULFATE: 400 SUSPENSION ORAL at 14:48

## 2021-11-16 ENCOUNTER — DOCUMENTATION ONLY (OUTPATIENT)
Dept: OTOLARYNGOLOGY | Facility: CLINIC | Age: 61
End: 2021-11-16
Payer: COMMERCIAL

## 2021-11-16 NOTE — CONFIDENTIAL NOTE
Video Esophagram Review Rounds  Imaging Review of MBSS conducted with attending physician Rosina Payton and reviewed/discussed with SLP Yulissa Kim, Jocelyn Ibrahim, Isabel Neville, and/or Ramona Solomon    Relevant Background:    Esophagram 11/5/2 normal    MBSS Date: 11/5/21    Findings:  Pharyngeal Weakness:No  Epiglottic dysfunction: No  Penetration: No  Aspiration: No  UES dysfunction: No  Details: safe swallow       Recommendations:  Diet:  Current     Needs ENT eval to consider voice therapy

## 2021-11-18 DIAGNOSIS — J47.9 BRONCHIECTASIS (H): ICD-10-CM

## 2021-11-18 DIAGNOSIS — R04.2 HEMOPTYSIS: ICD-10-CM

## 2021-11-18 DIAGNOSIS — J47.9 BRONCHIECTASIS (H): Primary | ICD-10-CM

## 2021-11-18 RX ORDER — DOXYCYCLINE HYCLATE 100 MG
100 TABLET ORAL 2 TIMES DAILY
Qty: 10 TABLET | Refills: 0 | Status: SHIPPED | OUTPATIENT
Start: 2021-11-18 | End: 2021-12-02

## 2021-11-18 RX ORDER — DOXYCYCLINE HYCLATE 100 MG
100 TABLET ORAL 2 TIMES DAILY
Qty: 10 TABLET | Refills: 0 | Status: SHIPPED | OUTPATIENT
Start: 2021-11-18 | End: 2021-11-18

## 2021-12-02 ENCOUNTER — HOSPITAL ENCOUNTER (OUTPATIENT)
Dept: CT IMAGING | Facility: CLINIC | Age: 61
Discharge: HOME | End: 2021-12-02
Attending: INTERNAL MEDICINE | Admitting: INTERNAL MEDICINE
Payer: COMMERCIAL

## 2021-12-02 ENCOUNTER — OFFICE VISIT (OUTPATIENT)
Dept: PULMONOLOGY | Facility: CLINIC | Age: 61
End: 2021-12-02
Payer: COMMERCIAL

## 2021-12-02 VITALS
WEIGHT: 110 LBS | BODY MASS INDEX: 17.68 KG/M2 | OXYGEN SATURATION: 99 % | DIASTOLIC BLOOD PRESSURE: 79 MMHG | HEART RATE: 84 BPM | HEIGHT: 66 IN | SYSTOLIC BLOOD PRESSURE: 117 MMHG | TEMPERATURE: 97.3 F

## 2021-12-02 DIAGNOSIS — J47.9 BRONCHIECTASIS WITHOUT COMPLICATION (H): Primary | ICD-10-CM

## 2021-12-02 DIAGNOSIS — J47.9 BRONCHIECTASIS WITHOUT COMPLICATION (H): ICD-10-CM

## 2021-12-02 DIAGNOSIS — R91.8 PULMONARY NODULES: ICD-10-CM

## 2021-12-02 PROCEDURE — 71250 CT THORAX DX C-: CPT

## 2021-12-02 PROCEDURE — 90471 IMMUNIZATION ADMIN: CPT | Performed by: INTERNAL MEDICINE

## 2021-12-02 PROCEDURE — 90670 PCV13 VACCINE IM: CPT | Performed by: INTERNAL MEDICINE

## 2021-12-02 PROCEDURE — 99214 OFFICE O/P EST MOD 30 MIN: CPT | Mod: 25 | Performed by: INTERNAL MEDICINE

## 2021-12-02 ASSESSMENT — MIFFLIN-ST. JEOR: SCORE: 1072.77

## 2021-12-02 NOTE — PROGRESS NOTES
[facility]  December 2, 2021         Assessment and Plan:   Rosie Zambrano is a 61-year-old female with medical history significant for hypercholesterolemia, bronchiectasis who is referred to the pulmonary clinic for management of her bronchiectasis nd recent hemoptysis.     1.  Bronchiectasis, recent hemoptysis, right middle lobe consolidation with scattered tree-in-bud findings  2.  Multiple small nodules, greatest 6 mm  3.  History of esophageal stenosis status post dilation (last 2014)     Recommendations as follows:  -Continue airway clearance with Aerobika at least twice daily for 10-minute sessions; use bronchodilator first and then hypertonic saline.  If you are unable to obtain good airway clearance with the aerobika then we would consider a vest.   -Trial a half dose of albuterol for each session of airway clearance  -Minimize heavy exertion as able  -Flonase prn for chronic postnasal drainage  -She is up-to-date with her vaccines including Prevnar 13 (given today), Covid booster and yearly influenza has received her Covid vaccine and I encouraged her to receive her yearly influenza   -We will repeat a chest CT today to evaluate previously seen consolidations and small nodules    Return to clinic in 6 months.       Soco Alcantar MD  Pulmonary, Allergy, Critical Care, and Sleep Medicine   Pager: [phone]        HPI:    Rosie Zambrano is a 61-year-old female with medical history significant for hypercholesterolemia, bronchiectasis who is referred to the pulmonary clinic for management of her bronchiectasis.  She was last seen in the pulmonary clinic in September 2021.  She is here today for follow-up.    Rosie says she is doing quite well at this time.  She had a recent episode of hemoptysis which began after traveling to [place], [place].  She says that she woke up in the middle of the night and coughed up blood that would fill the  bottom of a cup once.  After that episode she had streaky mucus for about 3 days.  She did: To the pulmonary clinic and we prescribed doxycycline but she did not start this until she was already feeling better.  She took 1 dose and says the next day she had a couple small red lesions on her arm, felt congested and itchy so she did not continue this.  She had no fevers or change in her respiratory status, or sick contacts.  She relates episodes of hemoptysis after strenuous activity as this is the second time this pattern has occurred.    She is getting excellent airway clearance with her aerobic, that she is using once to twice per day with hypertonic saline.  She has noted that drainage is better if she does this around 3 PM.  She is able to get significant amounts of yellow/green mucus up and in the nebulizer also seems to clear her sinuses so she has not had to use Flonase.  She overall feels that her breathing is better and that she is less constricted.  She is trying to increase her activity.  She has moved to [place] to live with her mother.            Review of Systems:     A 13 point ROS was performed and was negative except as listed above in the HPI.           Past Medical and Surgical History:     Past Medical History:   Diagnosis Date     Hypercholesterolemia      Past Surgical History:   Procedure Laterality Date     BLADDER SUSPENSION       BRONCHOSCOPY (RIGID OR FLEXIBLE), DIAGNOSTIC N/A 08/10/2021    Procedure: BRONCHOSCOPY, WITH BRONCHOALVEOLAR LAVAGE;  Surgeon: Jia Lizama MD;  Location:  GI     COLONOSCOPY  01/01/2014    repeat 2024     HYSTERECTOMY, VAGINAL       SALPINGO-OOPHORECTOMY BILATERAL       TONSILLECTOMY             Family History:     Family History   Problem Relation Age of Onset     Diabetes Father      Heart Disease Father      Dementia Father      Leukemia Father      Alcoholism Brother      Cancer Paternal Grandmother      Rheumatoid Arthritis Paternal Aunt         x 4  "aunts            Social History:     Social History     Socioeconomic History     Marital status:      Spouse name: Not on file     Number of children: Not on file     Years of education: Not on file     Highest education level: Not on file   Occupational History     Not on file   Tobacco Use     Smoking status: Passive Smoke Exposure - Never Smoker     Smokeless tobacco: Never Used   Vaping Use     Vaping Use: Never used   Substance and Sexual Activity     Alcohol use: Not Currently     Drug use: Not Currently     Sexual activity: Not Currently   Other Topics Concern     Not on file   Social History Narrative    Retail      Social Determinants of Health     Financial Resource Strain: Not on file   Food Insecurity: Not on file   Transportation Needs: Not on file   Physical Activity: Not on file   Stress: Not on file   Social Connections: Not on file   Intimate Partner Violence: Not on file   Housing Stability: Not on file            Medications:     Current Outpatient Medications   Medication     sodium chloride (NEBUSAL) 3 % neb solution     albuterol (PROVENTIL) (2.5 MG/3ML) 0.083% neb solution     doxycycline hyclate (VIBRA-TABS) 100 MG tablet     fluticasone (FLONASE) 50 MCG/ACT nasal spray     levalbuterol (XOPENEX HFA) 45 MCG/ACT inhaler     No current facility-administered medications for this visit.     Facility-Administered Medications Ordered in Other Visits   Medication     barium sulfate 40% (VARIBAR THIN HONEY) oral suspension            Physical Exam:   /79 (BP Location: Right arm, Patient Position: Sitting, Cuff Size: Adult Regular)   Pulse 84   Temp 97.3  F (36.3  C) (Tympanic)   Ht 1.664 m (5' 5.5\")   Wt 49.9 kg (110 lb)   SpO2 99%   BMI 18.03 kg/m      Constitutional:   Awake, alert and in no apparent distress     Eyes:   nonicteric     HEENT:   Supple without supraclavicular or cervical lymphadenopathy     Lungs:   Good air flow bilaterally.  Occasional scattered crackle. No " rhonchi.  No wheezes.     Cardiovascular:   Normal S1 and S2.  RRR.  No murmur, gallop or rub.     Musculoskeletal:   No edema, no digital clubbing present     Neurologic:   Alert and conversant.     Skin:   Warm, dry.  No rash on limited exam.             Data:   All laboratory and imaging data reviewed personally.      No results found for: SDES No results found for: CULT     No results found for this or any previous visit (from the past 168 hour(s)).    PFT: 9/10/2021 nonspecific pattern on pulmonary function testing with a reduced FEV1, 64% and FVC 63%.  Total lung capacity is normal at 84%.  Diffusion capacity is normal.      Chest CT-8/10/2021-personally reviewed: Consolidation of the anterior right middle lobe with some tree-in-bud plugging of the right lower lobe.  Bronchiectasis.  Scattered calcified granulomas and multiple small nodules noted.     FINDINGS:     Lungs: Right middle lobe consolidative opacity with air bronchograms  located anteriorly (series 3 image 40) additional consolidative  opacity of the right middle lobe anteriorly with surrounding  tree-in-bud opacities. Tree-in-bud opacities/early consolidation  throughout the right middle lobe (series 3 image 32). Mucus plugging  of the subsegmental airways of all lung lobes. The No pleural effusion  or pneumothorax. Scattered calcified granulomas.     Numerous solid and subsolid nodules of the lungs including but not  limited to:  -5 mm groundglass nodule in the left upper lobe (series 3 image 11).  -4 mm subsolid nodule of the left upper lobe laterally (series 3 image  17).  -6 mm solid pulmonary nodule of the left upper lobe posteriorly  (series 3 image 16).     Mediastinum: Heart size is within normal limits. No pericardial  effusion. Normal caliber of the aorta and pulmonary artery. No  abnormal thoracic lymph nodes. Standard branching pattern of the great  vessels.     Bones and soft tissues: No suspicious osseous lesion. Mild  degenerative  changes of the thoracolumbar spine.     Upper abdomen:  Limited evaluation of the upper abdomen.                                                                      IMPRESSION:   Consolidative opacities with air bronchograms located in the anterior  right middle lobe and right lower lobe with surrounding tree-in-bud  opacities with subsegmental airway mucous plugging of all lobes.  Additional scattered bilateral solid, subsolid, and tree-in-bud  nodules are additionally present. The constellation of findings  suggest pneumonia of the right middle and lower lobes secondary to  aspiration or possibly a sequela of nontuberculous mycobacteria.  Recommend continued follow-up until resolution.

## 2021-12-02 NOTE — NURSING NOTE
"Initial /79 (BP Location: Right arm, Patient Position: Sitting, Cuff Size: Adult Regular)   Pulse 84   Temp 97.3  F (36.3  C) (Tympanic)   Ht 1.664 m (5' 5.5\")   Wt 49.9 kg (110 lb)   SpO2 99%   BMI 18.03 kg/m   Estimated body mass index is 18.03 kg/m  as calculated from the following:    Height as of this encounter: 1.664 m (5' 5.5\").    Weight as of this encounter: 49.9 kg (110 lb). .    Arti Stuart MA    "

## 2021-12-12 ENCOUNTER — HEALTH MAINTENANCE LETTER (OUTPATIENT)
Age: 61
End: 2021-12-12

## 2022-10-03 ENCOUNTER — HEALTH MAINTENANCE LETTER (OUTPATIENT)
Age: 62
End: 2022-10-03

## 2022-10-22 ENCOUNTER — APPOINTMENT (OUTPATIENT)
Dept: GENERAL RADIOLOGY | Facility: CLINIC | Age: 62
End: 2022-10-22
Attending: NURSE PRACTITIONER
Payer: MEDICAID

## 2022-10-22 ENCOUNTER — HOSPITAL ENCOUNTER (EMERGENCY)
Facility: CLINIC | Age: 62
Discharge: HOME | End: 2022-10-22
Attending: NURSE PRACTITIONER | Admitting: NURSE PRACTITIONER
Payer: MEDICAID

## 2022-10-22 VITALS
TEMPERATURE: 98.5 F | WEIGHT: 111 LBS | RESPIRATION RATE: 22 BRPM | DIASTOLIC BLOOD PRESSURE: 69 MMHG | BODY MASS INDEX: 18.19 KG/M2 | HEART RATE: 97 BPM | SYSTOLIC BLOOD PRESSURE: 123 MMHG | OXYGEN SATURATION: 96 %

## 2022-10-22 DIAGNOSIS — J20.9 ACUTE BRONCHITIS: ICD-10-CM

## 2022-10-22 PROCEDURE — G0463 HOSPITAL OUTPT CLINIC VISIT: HCPCS | Performed by: NURSE PRACTITIONER

## 2022-10-22 PROCEDURE — 71046 X-RAY EXAM CHEST 2 VIEWS: CPT

## 2022-10-22 PROCEDURE — 99213 OFFICE O/P EST LOW 20 MIN: CPT | Performed by: NURSE PRACTITIONER

## 2022-10-22 RX ORDER — ALBUTEROL SULFATE 90 UG/1
2 AEROSOL, METERED RESPIRATORY (INHALATION) EVERY 6 HOURS PRN
Qty: 18 G | Refills: 0 | Status: SHIPPED | OUTPATIENT
Start: 2022-10-22

## 2022-10-22 ASSESSMENT — ENCOUNTER SYMPTOMS
TROUBLE SWALLOWING: 0
EYE DISCHARGE: 0
ARTHRALGIAS: 0
SINUS PAIN: 0
COUGH: 1
NAUSEA: 0
HEADACHES: 0
SHORTNESS OF BREATH: 0
JOINT SWELLING: 0
WEAKNESS: 0
VOMITING: 0
ABDOMINAL PAIN: 0
LIGHT-HEADEDNESS: 0
EYE REDNESS: 0
MYALGIAS: 0
FATIGUE: 0
NUMBNESS: 0
FEVER: 0
RHINORRHEA: 1
SORE THROAT: 0
SINUS PRESSURE: 0
DIZZINESS: 0
CHILLS: 0

## 2022-10-22 ASSESSMENT — ACTIVITIES OF DAILY LIVING (ADL): ADLS_ACUITY_SCORE: 35

## 2022-10-22 NOTE — ED PROVIDER NOTES
History     Chief Complaint   Patient presents with     Cough     HPI  Alis Zambrano is a 62 year old female with history of bronchiectasis who presents to the urgent care for evaluation of cough and congestion for about five days. Denies fever, headache, dizziness, sore throat, shortness of breath, chest pain, abdominal pain, nausea, vomiting, diarrhea, and rash. Not using OTC medications as she is sensitive to many medications.     Allergies:  Allergies   Allergen Reactions     Cetirizine      Incontinent      Diphenhydramine      Drowsy and paradoxical stimulation     Famotidine      Hives and anxiety     Ipratropium      Shaky, rash, itching     Pantoprazole      Abdominal Pain     Shrimp Flavor      Throat swelling     Sulfa Drugs Other (See Comments) and Hives     Rash      Ciprofloxacin Other (See Comments)     Knees didn't work for a couple of months  Knees don't work  Leg weakness       Codeine Dizziness, Headache and Nausea and Vomiting     Conjugated Estrogens Other (See Comments)     Other reaction(s): Unknown/Not Verified  Knees didn't work  Knees don't work       Penicillins Diarrhea     Prednisone Anxiety and Other (See Comments)     Other reaction(s): Tremors, Unknown/Not Verified  Anxiety       Problem List:    Patient Active Problem List    Diagnosis Date Noted     Bronchiectasis without complication (H) 12/24/2015     Priority: Medium     Dysphagia 12/24/2015     Priority: Medium     Multiple drug allergies 12/24/2015     Priority: Medium     Multiple food allergies 12/24/2015     Priority: Medium     Seasonal allergic rhinitis 09/05/2013     Priority: Medium     Hypercholesterolemia 06/27/2011     Priority: Medium      Past Medical History:    Past Medical History:   Diagnosis Date     Hypercholesterolemia      Past Surgical History:    Past Surgical History:   Procedure Laterality Date     BLADDER SUSPENSION       BRONCHOSCOPY (RIGID OR FLEXIBLE), DIAGNOSTIC N/A 08/10/2021     Procedure: BRONCHOSCOPY, WITH BRONCHOALVEOLAR LAVAGE;  Surgeon: Jia Lizama MD;  Location:  GI     COLONOSCOPY  01/01/2014    repeat 2024     HYSTERECTOMY, VAGINAL       SALPINGO-OOPHORECTOMY BILATERAL       TONSILLECTOMY       Family History:    Family History   Problem Relation Age of Onset     Diabetes Father      Heart Disease Father      Dementia Father      Leukemia Father      Alcoholism Brother      Cancer Paternal Grandmother      Rheumatoid Arthritis Paternal Aunt         x 4 aunts     Social History:  Marital Status:   [4]  Social History     Tobacco Use     Smoking status: Passive Smoke Exposure - Never Smoker     Smokeless tobacco: Never   Vaping Use     Vaping Use: Never used   Substance Use Topics     Alcohol use: Not Currently     Drug use: Not Currently      Medications:    albuterol (PROAIR HFA/PROVENTIL HFA/VENTOLIN HFA) 108 (90 Base) MCG/ACT inhaler  albuterol (PROVENTIL) (2.5 MG/3ML) 0.083% neb solution  sodium chloride (NEBUSAL) 3 % neb solution      Review of Systems   Constitutional: Negative for chills, fatigue and fever.   HENT: Positive for congestion and rhinorrhea. Negative for ear discharge, ear pain, sinus pressure, sinus pain, sore throat and trouble swallowing.    Eyes: Negative for discharge and redness.   Respiratory: Positive for cough. Negative for shortness of breath.    Cardiovascular: Negative for chest pain.   Gastrointestinal: Negative for abdominal pain, nausea and vomiting.   Musculoskeletal: Negative for arthralgias, joint swelling and myalgias.   Skin: Negative for rash.   Neurological: Negative for dizziness, weakness, light-headedness, numbness and headaches.   All other systems reviewed and are negative.    Physical Exam   BP: 123/69  Pulse: 97  Temp: 98.5  F (36.9  C)  Resp: 22  Weight: 50.3 kg (111 lb)  SpO2: 96 %    Physical Exam  Constitutional:       General: She is not in acute distress.     Appearance: She is well-developed. She is not  diaphoretic.   Eyes:      Conjunctiva/sclera: Conjunctivae normal.      Pupils: Pupils are equal, round, and reactive to light.   Cardiovascular:      Rate and Rhythm: Normal rate and regular rhythm.      Pulses: Normal pulses.   Pulmonary:      Effort: Pulmonary effort is normal. No respiratory distress.      Breath sounds: Normal air entry. No decreased air movement. Wheezing present. No decreased breath sounds or rhonchi.   Abdominal:      General: There is no distension.      Palpations: Abdomen is soft.      Tenderness: There is no abdominal tenderness.   Musculoskeletal:         General: Normal range of motion.      Cervical back: Normal range of motion and neck supple.   Skin:     General: Skin is warm.      Capillary Refill: Capillary refill takes less than 2 seconds.   Neurological:      General: No focal deficit present.      Mental Status: She is alert and oriented to person, place, and time.   Psychiatric:         Mood and Affect: Mood normal.         ED Course           Procedures  Results for orders placed or performed during the hospital encounter of 10/22/22 (from the past 24 hour(s))   Chest XR,  PA & LAT    Narrative    EXAM: XR CHEST 2 VIEWS  LOCATION: [facility]  DATE/TIME: 10/22/2022 1:20 PM    INDICATION: cough  COMPARISON: 08/10/2021.      Impression    IMPRESSION: The lungs are hyperinflated consistent with COPD. There is persistent atelectasis in the right middle lobe which is not significant change from the prior study. No acute consolidation, pneumothorax or pleural effusion. Normal heart size and   pulmonary vascularity. No bony fracture.       Medications - No data to display    Assessments & Plan (with Medical Decision Making)   Alis Zambrano is a 62 year old female with history of bronchiectasis who presents to the urgent care for evaluation of cough and congestion for about five days. Negative Covid test at home. Chest xray without acute  consolidation, effusion or pneumothorax. Discussed viral bronchitis, viral etiology of symptoms and average course of illness. Would like to try albuterol. May use over the counter medications as needed and appropriate. Increase rest and hydration. Return precautions reviewed, all questions answered. Patient is agreeable to plan of care and  discharged in good condition.    I have reviewed the nursing notes.    I have reviewed the findings, diagnosis, plan and need for follow up with the patient.    New Prescriptions    ALBUTEROL (PROAIR HFA/PROVENTIL HFA/VENTOLIN HFA) 108 (90 BASE) MCG/ACT INHALER    Inhale 2 puffs into the lungs every 6 hours as needed for shortness of breath / dyspnea or wheezing     Final diagnoses:   Acute bronchitis     10/22/2022   [facility] EMERGENCY DEPT     Trini Iniguez, APRN CNP  10/22/22 [id number]

## 2023-01-25 NOTE — PROGRESS NOTES
[facility]    Pulmonary, Allergy, Critical Care and Sleep Medicine    Follow Up Clinic Visit  1/26/2023    Alis Zambrano MRN# [id number]   Age: 62 year old YOB: 1960     Primary care provider: Annetta Dior     Assessment and Recommendations:    Alis Zambrano is a 62 year old female with a history of Bronchiectasis who presents for follow up of bronchiectasis.     Bronchiectasis  Prior episodes of hemoptysis but none in past year. Significant side effects from albuterol and getting good airway clearance from Aerobika in combination with postural drainage. Could consider trial of Xopenex in future if needed more aggressive airway clearance. CT chest stable today and will continue with current regimen for now.   - CT Chest to monitor bronchiectasis today  - Continue airway clearance with Aerobika, postural drainage  - Increase of daily activity, avoidance of heavy exertion has been discussed in past  - Discussed recommendation for COVID booster, mask wearing in public    Follow up in 6 months     Nikole Santiago MD PhD  Pulmonary and Critical Care   Pager [phone]    35 minutes spent on the date of the encounter doing chart review, history and exam, documentation and further activities per the note.    History of Present Illness:    HPI:   Patient initially diagnosed with bronchiectasis at [place] and then followed in [facility] system. Currently follows with Dr. Alcantar and first seen Sept 2021. At that visit reported an increase in symptoms in July 2021 with hemoptysis. Multiple ED visits. CT 8/10/21 with RML/RLL consolidation and surrounding tree-in-bud and mucus plugging. Urgent bronch 8/10/22 which appeared normal, BAL of RML neutrophilic with negative cultures. As of September visit was back to baseline of chronic cough that mostly dry but sometimes productive of green mucus. No further hemoptysis, did report dyspnea on exertion, PND, and  coughing with food. Started on regimen of airway clearance with bronchodilator and hypertonic saline nebs, workup including sputum cultures and bronchiectasis blood tests. Inflammatory markers elevated.    Seen by GI in October 2021. VSS with barium esophagram normal. Last visit in Pulmonary clinic Dec 2021 at which time doing well. Had had one episode of hemoptysis and was prescribed doxycycline but was feeling better and had a possible reaction so did not take most of prescription. Able to get a lot of airway clearance with nebs and Aerobika. CT Chest repeated with stable right lung bronchiectasis, consolidation, and tree-in-bud infiltrates. Left lung with mild traction bronchiectasis, increased reticulonodular tree-in-bud findings. ED visit 10/2022 for congestion and cough, no acute findings on CXR.     Today had a lot of questions about imaging findings, clinical aspects of bronchiectasis. Clinically is relatively stable. Trying to get more exercise, recently able to go out snowshoeing. Does cough more after exertion. Cough is productive of clear to green phlegm, no blood. Doing airway clearance with flutter valve, not using nebs right now. Will feel shaky and nervous on albuterol. No fevers, chills, or night sweats. No significant sinus or reflux symptoms.     Additional data from chart review  Pulmonary, GI, Primary Care and outside Pulmonary notes reviewed.    Procedures  2014 Esophageal dilation     9/2021 PFTs: Personally reviewed: Non-specific spirometry with normal lung volumes and diffusion capacity    Imaging  12/2021 CT Chest: Personally reviewed, bronchiectasis within right middle lobe and lingula    1/2023 CT Chest: Personally reviewed, Stable bronchiectasis, air trapping on expiratory images    10/2022 CXR: Personally reviewed, hyperinflation stable from past, RML infiltrate stable from past that thought to represent atelectasis     Review of Systems:  Complete 12 point ROS negative unless mentioned  in HPI    Medications, Allergies:    Medications:  Current Outpatient Medications   Medication     albuterol (PROAIR HFA/PROVENTIL HFA/VENTOLIN HFA) 108 (90 Base) MCG/ACT inhaler     albuterol (PROVENTIL) (2.5 MG/3ML) 0.083% neb solution     sodium chloride (NEBUSAL) 3 % neb solution     No current facility-administered medications for this visit.     Facility-Administered Medications Ordered in Other Visits   Medication     barium sulfate 40% (VARIBAR THIN HONEY) oral suspension     Allergies:  Allergies   Allergen Reactions     Cetirizine      Incontinent      Diphenhydramine      Drowsy and paradoxical stimulation     Famotidine      Hives and anxiety     Ipratropium      Shaky, rash, itching     Pantoprazole      Abdominal Pain     Shrimp Flavor      Throat swelling     Sulfa Drugs Other (See Comments) and Hives     Rash      Ciprofloxacin Other (See Comments)     Knees didn't work for a couple of months  Knees don't work  Leg weakness       Codeine Dizziness, Headache and Nausea and Vomiting     Conjugated Estrogens Other (See Comments)     Other reaction(s): Unknown/Not Verified  Knees didn't work  Knees don't work       Penicillins Diarrhea     Prednisone Anxiety and Other (See Comments)     Other reaction(s): Tremors, Unknown/Not Verified  Anxiety       Physical Exam:    /80 (BP Location: Right arm, Patient Position: Sitting, Cuff Size: Adult Regular)   Pulse 94   Wt 51.7 kg (114 lb)   SpO2 94%   BMI 18.68 kg/m      General: Sitting up in NAD  HEENT: anicteric, moist mucosa  Neck: no palpable lymphadenopathy, no JVD noted  Chest: CTAB, no wheezing or crackles  Cardiac: RRR no murmurs, radial pulses intact  Abdomen: Soft, non tender, active BS  Extremities: No LE Edema  Neuro: A&Ox3, no focal deficits   Skin: no rash noted on limited exam  Psych: Appropriate  Laboratory, imaging, and microbiologic data:    All laboratory and imaging data reviewed, pertinent results discussed above.

## 2023-01-26 ENCOUNTER — OFFICE VISIT (OUTPATIENT)
Dept: PULMONOLOGY | Facility: CLINIC | Age: 63
End: 2023-01-26
Payer: COMMERCIAL

## 2023-01-26 ENCOUNTER — HOSPITAL ENCOUNTER (OUTPATIENT)
Dept: CT IMAGING | Facility: CLINIC | Age: 63
Discharge: HOME | End: 2023-01-26
Attending: STUDENT IN AN ORGANIZED HEALTH CARE EDUCATION/TRAINING PROGRAM | Admitting: STUDENT IN AN ORGANIZED HEALTH CARE EDUCATION/TRAINING PROGRAM
Payer: COMMERCIAL

## 2023-01-26 VITALS
DIASTOLIC BLOOD PRESSURE: 80 MMHG | HEART RATE: 94 BPM | OXYGEN SATURATION: 94 % | WEIGHT: 114 LBS | SYSTOLIC BLOOD PRESSURE: 119 MMHG | BODY MASS INDEX: 18.68 KG/M2

## 2023-01-26 DIAGNOSIS — J47.9 BRONCHIECTASIS WITHOUT COMPLICATION (H): ICD-10-CM

## 2023-01-26 DIAGNOSIS — J47.9 BRONCHIECTASIS WITHOUT COMPLICATION (H): Primary | ICD-10-CM

## 2023-01-26 PROCEDURE — 99214 OFFICE O/P EST MOD 30 MIN: CPT | Performed by: STUDENT IN AN ORGANIZED HEALTH CARE EDUCATION/TRAINING PROGRAM

## 2023-01-26 PROCEDURE — 71250 CT THORAX DX C-: CPT

## 2023-01-26 RX ORDER — SODIUM CHLORIDE FOR INHALATION 3 %
3 VIAL, NEBULIZER (ML) INHALATION 2 TIMES DAILY
Qty: 300 ML | Refills: 3 | Status: SHIPPED | OUTPATIENT
Start: 2023-01-26

## 2023-01-26 RX ORDER — ALBUTEROL SULFATE 0.83 MG/ML
2.5 SOLUTION RESPIRATORY (INHALATION) 2 TIMES DAILY
Qty: 180 ML | Refills: 3 | Status: SHIPPED | OUTPATIENT
Start: 2023-01-26

## 2023-01-26 NOTE — PATIENT INSTRUCTIONS
Continue to do airway clearance with Aerobika and postural drainage.     I agree with increasing your activity level, this should also help with airway clearance.    We will do CT of your lungs to check your bronchiectasis and make sure you don't have worsening mucus in your airways. Depending on CT may need to increase your airway clearance regimen with nebs.    Avoidance of sick people, triggers as we talked about.    Go ahead and get COVID booster.

## 2023-01-26 NOTE — NURSING NOTE
Chief Complaint   Patient presents with     Bronchiectasis     Bronchiectasis follow up  Patient has questions that relate to how to avoid getting Bronchietasis and what are the causes.Per patient coughing and congestion has gotten better since ED visit.        Vitals:    01/26/23 0822   Pulse: 94   SpO2: 94%   Weight: 51.7 kg (114 lb)     Wt Readings from Last 1 Encounters:   01/26/23 51.7 kg (114 lb)     Angela Morrell MA

## 2023-07-27 ENCOUNTER — OFFICE VISIT (OUTPATIENT)
Dept: PULMONOLOGY | Facility: CLINIC | Age: 63
End: 2023-07-27
Payer: COMMERCIAL

## 2023-07-27 VITALS
DIASTOLIC BLOOD PRESSURE: 71 MMHG | WEIGHT: 116 LBS | SYSTOLIC BLOOD PRESSURE: 106 MMHG | OXYGEN SATURATION: 98 % | BODY MASS INDEX: 18.64 KG/M2 | HEIGHT: 66 IN | HEART RATE: 83 BPM | TEMPERATURE: 97.2 F

## 2023-07-27 DIAGNOSIS — J47.9 BRONCHIECTASIS WITHOUT COMPLICATION (H): Primary | ICD-10-CM

## 2023-07-27 PROCEDURE — 99213 OFFICE O/P EST LOW 20 MIN: CPT | Performed by: INTERNAL MEDICINE

## 2023-07-27 NOTE — PATIENT INSTRUCTIONS
Rosie,    It was nice seeing you!  I am glad to hear you are doing well.     Recommendations as follows:   - continue current aerobika and coughing techniques  - see lifestyle changes document regarding controlling GERD; if ineffective and you are having symptoms > 2 x per week then we should consider starting an antacid medication such as prilosec daily   - increase regular exercise  - you are due for a covid booster; I also recommend that you receive the RSV and influenza vaccines in the fall   - Please call the pulmonary clinic with any questions. The pulmonary clinic number is: [phone].    Stay up to date with vaccinations including your yearly flu vaccine. Wash your hands regularly. Consider wearing a mask in crowded places to reduce the risk of respiratory illnesses. I recommend that you receive the COVID-19 vaccine and stay up to date with boosters.     Have a nice summer and stay well! Please let me know if you have questions or concerns.     Pastora Alcantar MD  Pulmonary, Allergy, Critical Care, and Sleep Medicine   [facility]

## 2023-07-27 NOTE — PROGRESS NOTES
[facility]    Pulmonary, Allergy, Critical Care and Sleep Medicine    Follow Up Clinic Visit  7/27/23    Alis Zambrano MRN# [id number]   Age: 63 year old YOB: 1960     Primary care provider: Annetta Dior     Assessment and Recommendations:    Alis Zambrano is a 63 year old female with a history of Bronchiectasis who presents for follow up of bronchiectasis.     Bronchiectasis with previously neg work-up  Prior episodes of hemoptysis but none in past year. Significant side effects from albuterol neb (including at half dose) but she has tolerated albuterol inhaler. She is getting good airway clearance from Aerobika in combination with postural drainage. She had similar side effects with Xopenex. CT chest in 1/2023 stable with stable symptoms at this time.   - Continue airway clearance with Aerobika, postural drainage; albuterol inhaler prn and HTS to be used when feeling sick with increased airway clearance  - Increase of daily activity with goal of 3x per week of cardiovascular exercise  - Stay up to date with covid boosters; also recommended RSV and influenza vaccine in the fall  - Regular hand washing     Follow up in 12 months     Soco Alcantar MD  Pulmonary, Allergy, Critical Care, and Sleep Medicine   [facility]   Pager: [phone]    This note was created using dictation software and may contain errors.  Please contact the creator for any clarifications that are needed.    I have spent 25 minutes on the day of the visit to review the chart, interview and examine the patient, review labs and imaging, formulate a plan, document and submit orders. Time documented is excluding time spent for PFT interpretation      History of Present Illness:    HPI:   Currently doing well. She remains active. She has noted HR in the 160's at times when walking but has no other symptoms such as JOYCE, dizziness. No hemoptysis but baseline coughing that is  productive off yellow/green mucous.  She is using aerobika at least twice daily with postural drainage. Not using albuterol because she has not felt the need; had jitters with albuterol  Nebs even at half dose. Also had this problem with xopenex. Has tolerated albuterol inhaler but is not using this regularly.      Has had a sore throat intermittently upon waking up; attributes this to gerd. Discussed need to monitor for and treat GERD if persistent. Planning to initiate an exercise regimen with partner and looking forward to this. Denies fevers,chills, weight loss. Has gained weight and is happy about this.     Prior history:   Patient initially diagnosed with bronchiectasis at [place] and then followed in [facility] system. Currently follows with Dr. Alcantar and first seen Sept 2021. At that visit reported an increase in symptoms in July 2021 with hemoptysis. Multiple ED visits. CT 8/10/21 with RML/RLL consolidation and surrounding tree-in-bud and mucus plugging. Urgent bronch 8/10/22 which appeared normal, BAL of RML neutrophilic with negative cultures. As of September visit was back to baseline of chronic cough that mostly dry but sometimes productive of green mucus. No further hemoptysis, did report dyspnea on exertion, PND, and coughing with food. Started on regimen of airway clearance with bronchodilator and hypertonic saline nebs, workup including sputum cultures and bronchiectasis blood tests. Inflammatory markers elevated.    Seen by GI in October 2021. VSS with barium esophagram normal. Last visit in Pulmonary clinic Dec 2021 at which time doing well. Had had one episode of hemoptysis and was prescribed doxycycline but was feeling better and had a possible reaction so did not take most of prescription. Able to get a lot of airway clearance with nebs and Aerobika. CT Chest repeated with stable right lung bronchiectasis, consolidation, and tree-in-bud infiltrates. Left lung with mild traction bronchiectasis,  increased reticulonodular tree-in-bud findings. ED visit 10/2022 for congestion and cough, no acute findings on CXR.     Today had a lot of questions about imaging findings, clinical aspects of bronchiectasis. Clinically is relatively stable. Trying to get more exercise, recently able to go out snowshoeing. Does cough more after exertion. Cough is productive of clear to green phlegm, no blood. Doing airway clearance with flutter valve, not using nebs right now. Will feel shaky and nervous on albuterol. No fevers, chills, or night sweats. No significant sinus or reflux symptoms.     Additional data from chart review  Pulmonary, GI, Primary Care and outside Pulmonary notes reviewed.    Procedures  2014 Esophageal dilation     9/2021 PFTs: Personally reviewed: Non-specific spirometry with normal lung volumes and diffusion capacity    Imaging  12/2021 CT Chest: Personally reviewed, bronchiectasis within right middle lobe and lingula    1/2023 CT Chest: Personally reviewed, Stable bronchiectasis, air trapping on expiratory images    10/2022 CXR: Personally reviewed, hyperinflation stable from past, RML infiltrate stable from past that thought to represent atelectasis     Review of Systems:  Complete 12 point ROS negative unless mentioned in HPI    Medications, Allergies:    Medications:  Current Outpatient Medications   Medication     albuterol (PROAIR HFA/PROVENTIL HFA/VENTOLIN HFA) 108 (90 Base) MCG/ACT inhaler     albuterol (PROVENTIL) (2.5 MG/3ML) 0.083% neb solution     sodium chloride (NEBUSAL) 3 % neb solution     No current facility-administered medications for this visit.     Facility-Administered Medications Ordered in Other Visits   Medication     barium sulfate 40% (VARIBAR THIN HONEY) oral suspension     Allergies:  Allergies   Allergen Reactions     Cetirizine      Incontinent      Diphenhydramine      Drowsy and paradoxical stimulation     Famotidine      Hives and anxiety     Ipratropium      Shaky, rash,  "itching     Pantoprazole      Abdominal Pain     Shrimp Flavor      Throat swelling     Sulfa Antibiotics Other (See Comments) and Hives     Rash      Ciprofloxacin Other (See Comments)     Knees didn't work for a couple of months  Knees don't work  Leg weakness       Codeine Dizziness, Headache and Nausea and Vomiting     Conjugated Estrogens Other (See Comments)     Other reaction(s): Unknown/Not Verified  Knees didn't work  Knees don't work       Penicillins Diarrhea     Prednisone Anxiety and Other (See Comments)     Other reaction(s): Tremors, Unknown/Not Verified  Anxiety       Physical Exam:    /71 (BP Location: Right arm, Patient Position: Sitting, Cuff Size: Adult Regular)   Pulse 83   Temp 97.2  F (36.2  C) (Tympanic)   Ht 1.67 m (5' 5.75\")   Wt 52.6 kg (116 lb)   SpO2 98%   BMI 18.87 kg/m      General: Sitting up in NAD  HEENT: anicteric, moist mucosa  Neck: no palpable lymphadenopathy, no JVD noted  Chest: CTAB, no wheezing or crackles  Cardiac: RRR no murmurs, radial pulses intact  Extremities: No LE Edema  Neuro: A&Ox3, no focal deficits   Skin: no rash noted on limited exam  Psych: Appropriate  Laboratory, imaging, and microbiologic data:    All laboratory and imaging data reviewed, pertinent results discussed above.    CT chest - 1/26/23 - personally reviewed and I agree with the radiologist's read of: FINDINGS:   LUNGS AND PLEURA: Stable bronchiectasis and reticulonodular  infiltrates and consolidations with some tree-in-bud appearance in the  right middle lung lobe are again noted as compared with prior study  dated 12/20/2021 indicating chronic inflammatory/infectious process as  can be seen with Mycobacterium avium intracellulare as well as others.  Areas of subtle groundglass densities and tree-in-bud nodularity with  mild bronchiectasis are also seen in the anterolateral aspect of the  inferior right lower lung lobe and also in the medial right lower lung  lobe as well as in the " lingula. Some bronchiectasis is noted in the  lower lung lobes. Some subpleural scarring is also seen in the areas  of inflammation. There are scattered small nodules in the bilateral  lungs measuring up to 0.7 cm which are stable since the prior study  and likely represent scarring or inflammatory or infectious process.  No enlarging nodules are identified.     There is air trapping in peripheral aspects of the bilateral lungs on  the expiratory supine imaging. No new infiltrate, nodule, effusion, or  pneumothorax is identified.     MEDIASTINUM/AXILLAE: Heart is grossly normal in size. No mediastinal,  hilar, or axillary lymphadenopathy is identified. Visualized portions  of the thyroid are unremarkable.     UPPER ABDOMEN: Minimal nonaneurysmal atherosclerosis is noted.  Visualized portions of the upper abdominal contents are otherwise  unremarkable. Further evaluation is limited due to lack of IV  contrast.     MUSCULOSKELETAL: No aggressive osseous lesions or acute osseous  fractures.                                                                      IMPRESSION:   1. Chronic inflammatory/infectious processes in the right middle lung  lobe, lingula, anterolateral right lower lung lobe are essentially  stable since the prior study dated 12/2/2021. These could represent an  atypical infectious process such as Mycobacterium avium intracellulare  as well as others and are associated with some bronchiectasis.  2. Mild air trapping in the bilateral peripheral lungs on the supine  expiratory imaging was not definitely appreciated on the prior study  likely due to lack of expiratory imaging on prior study.  3. Mild nodularity in lungs is stable since the most recent prior CT.  4. No other changes.

## 2023-07-27 NOTE — NURSING NOTE
"Initial /71 (BP Location: Right arm, Patient Position: Sitting, Cuff Size: Adult Regular)   Pulse 83   Temp 97.2  F (36.2  C) (Tympanic)   Ht 1.67 m (5' 5.75\")   Wt 52.6 kg (116 lb)   SpO2 98%   BMI 18.87 kg/m   Estimated body mass index is 18.87 kg/m  as calculated from the following:    Height as of this encounter: 1.67 m (5' 5.75\").    Weight as of this encounter: 52.6 kg (116 lb). ..  Arti Stuart MA    "

## 2023-10-21 ENCOUNTER — HEALTH MAINTENANCE LETTER (OUTPATIENT)
Age: 63
End: 2023-10-21

## 2023-12-30 ENCOUNTER — HEALTH MAINTENANCE LETTER (OUTPATIENT)
Age: 63
End: 2023-12-30

## 2024-10-29 ENCOUNTER — TELEPHONE (OUTPATIENT)
Dept: PULMONOLOGY | Facility: CLINIC | Age: 64
End: 2024-10-29

## 2024-10-29 NOTE — TELEPHONE ENCOUNTER
[facility] Call Center    Phone Message    May a detailed message be left on voicemail: yes     Reason for Call: [place] in January 2025 and has appt scheduled to follow-up with Dr. Alcantar 3/20/25.     However, pt recently had a chest CT done 9/30/24 which showed a 2.7 cm nodule that is new compared to previous exams, and she was instructed to have a repeat chest CT in January 2025-pt is wondering if Dr. Alcantar could possibly order this for her?     She would also like to be seen earlier than her 3/20/25 appt if possible-says the doctors in her area are not familiar with bronchiectasis, and she does not trust their interpretation of her results/scans.     Please advise if chest CT can be ordered for January 2025, and/or if earlier appt is possible?    Action Taken: Other: Pulm    Travel Screening: Not Applicable

## (undated) RX ORDER — LIDOCAINE HYDROCHLORIDE 10 MG/ML
INJECTION, SOLUTION EPIDURAL; INFILTRATION; INTRACAUDAL; PERINEURAL
Status: DISPENSED
Start: 2021-08-10

## (undated) RX ORDER — LIDOCAINE HYDROCHLORIDE 20 MG/ML
SOLUTION OROPHARYNGEAL
Status: DISPENSED
Start: 2021-08-10

## (undated) RX ORDER — FENTANYL CITRATE 50 UG/ML
INJECTION, SOLUTION INTRAMUSCULAR; INTRAVENOUS
Status: DISPENSED
Start: 2021-08-10